# Patient Record
Sex: MALE | Race: WHITE | NOT HISPANIC OR LATINO | Employment: UNEMPLOYED | URBAN - METROPOLITAN AREA
[De-identification: names, ages, dates, MRNs, and addresses within clinical notes are randomized per-mention and may not be internally consistent; named-entity substitution may affect disease eponyms.]

---

## 2020-03-09 ENCOUNTER — TELEPHONE (OUTPATIENT)
Dept: CARDIOTHORACIC SURGERY | Facility: CLINIC | Age: 65
End: 2020-03-09

## 2020-03-09 NOTE — TELEPHONE ENCOUNTER
Called to schedule pt a consult appt.No answer , pt did not have a vm set up. Will try again at a later time.

## 2020-03-10 ENCOUNTER — TELEPHONE (OUTPATIENT)
Dept: CARDIOTHORACIC SURGERY | Facility: CLINIC | Age: 65
End: 2020-03-10

## 2020-03-10 NOTE — TELEPHONE ENCOUNTER
Scheduled pt for consult with Dr. Ceron. Pt was instructed to  disk of [facility]. Pt verbalized understanding appt letter mailed.

## 2020-03-18 ENCOUNTER — TELEPHONE (OUTPATIENT)
Dept: CARDIOTHORACIC SURGERY | Facility: CLINIC | Age: 65
End: 2020-03-18

## 2020-03-18 NOTE — TELEPHONE ENCOUNTER
Informed pt that rescheduling was his decision. Informed pt that if he was not feeling well he should keep his appt,but if he felt well and wanted to reschedule then he can do so. Pt opted to reschedule. Pt is now scheduled for  4/16 . Pt verbalized understanding.Appt letter mailed         ----- Message from Nola Solitario sent at 3/18/2020  3:23 PM CDT -----  Contact: pt called [phone]  Inquiring about appointment on 3/26/22.  Wanting to know if he should reshedule

## 2020-04-16 ENCOUNTER — OFFICE VISIT (OUTPATIENT)
Dept: CARDIOTHORACIC SURGERY | Facility: CLINIC | Age: 65
End: 2020-04-16
Payer: COMMERCIAL

## 2020-04-16 DIAGNOSIS — I25.110 CORONARY ARTERY DISEASE INVOLVING NATIVE CORONARY ARTERY OF NATIVE HEART WITH UNSTABLE ANGINA PECTORIS: Primary | ICD-10-CM

## 2020-04-16 PROBLEM — I25.119 ATHEROSCLEROSIS OF NATIVE CORONARY ARTERY WITH ANGINA PECTORIS: Status: ACTIVE | Noted: 2020-03-04

## 2020-04-16 PROCEDURE — 99205 PR OFFICE/OUTPT VISIT, NEW, LEVL V, 60-74 MIN: ICD-10-PCS | Mod: 95,,, | Performed by: THORACIC SURGERY (CARDIOTHORACIC VASCULAR SURGERY)

## 2020-04-16 PROCEDURE — 99205 OFFICE O/P NEW HI 60 MIN: CPT | Mod: 95,,, | Performed by: THORACIC SURGERY (CARDIOTHORACIC VASCULAR SURGERY)

## 2020-04-16 NOTE — PROGRESS NOTES
"The patient location is: at home in [place]   The chief complaint leading to consultation is: Consult for possible CABG  Visit type: audiovisual  Total time spent with patient: 35  Each patient to whom he or she provides medical services by telemedicine is:  (1) informed of the relationship between the physician and patient and the respective role of any other health care provider with respect to management of the patient; and (2) notified that he or she may decline to receive medical services by telemedicine and may withdraw from such care at any time.    Subjective:      Patient ID: Jeffery Melton is a 64 y.o. male.    Chief Complaint: No chief complaint on file.    HPI:  Jeffery Melton is a 64 y.o. male with a medical history of asthma, CAD, HLD, and HTN who presents to CTS via virtual visit for possible CABG. Patient had a [facility] cath perfomred in November of 2019 and was was told that he had lesion that were not stentable. Patient lives in [place] and was evaluated by CTS there however he would prefer to be seen in [place] since it is closer to his daughter.  He also states he would like a second option. Patient reports that he does have intermittent shortness and chest heaviness with physical activity.  He is able to ambulate without difficulty, appears to have good functional status.  He has a history of a "mild" stroke in 2003 but denies any deficits.  Patient does not drink ETOH or use tobacco.      Family and social history reviewed    Review of patient's allergies indicates:   Allergen Reactions    Penicillins      Past Medical History:   Diagnosis Date    Asthma     CAD (coronary artery disease)     Hyperlipidemia     Hypertension      No past surgical history on file.  Family History     Problem Relation (Age of Onset)    Hypertension Mother, Father        Social History     Socioeconomic History    Marital status:      Spouse name: Not on file    Number of children: Not on file    " Years of education: Not on file    Highest education level: Not on file   Occupational History    Not on file   Social Needs    Financial resource strain: Not on file    Food insecurity:     Worry: Not on file     Inability: Not on file    Transportation needs:     Medical: Not on file     Non-medical: Not on file   Tobacco Use    Smoking status: Never Smoker    Smokeless tobacco: Never Used   Substance and Sexual Activity    Alcohol use: Not Currently    Drug use: Never    Sexual activity: Not on file   Lifestyle    Physical activity:     Days per week: Not on file     Minutes per session: Not on file    Stress: Not on file   Relationships    Social connections:     Talks on phone: Not on file     Gets together: Not on file     Attends Hinduism service: Not on file     Active member of club or organization: Not on file     Attends meetings of clubs or organizations: Not on file     Relationship status: Not on file   Other Topics Concern    Not on file   Social History Narrative    Not on file       Current medications Reviewed    Review of Systems   Constitutional: Negative for activity change, appetite change, fatigue and fever.   HENT: Negative for nosebleeds.    Respiratory: Positive for shortness of breath. Negative for cough.    Cardiovascular: Positive for chest pain. Negative for palpitations and leg swelling.   Gastrointestinal: Negative for abdominal distention, abdominal pain and nausea.   Genitourinary: Negative for frequency.   Musculoskeletal: Negative for arthralgias and myalgias.   Skin: Negative for rash.   Neurological: Negative for dizziness and numbness.   Hematological: Does not bruise/bleed easily.     Objective:   Physical Exam: Unable to perform, patient was seen virtually    Diagnostic Results:   ECHO 3/5/2020  · Left Ventricle: Normal left ventricle cavity size. Normal wall motion.   Normal (55-65%) ejection fraction. Mild concentric hypertrophy observed.   Grade I (mild)  left ventricular diastolic dysfunction.  · Aortic Valve: The valve is tricuspid. The aortic valve is sclerotic   without reduced excursion. No aortic stenosis. No aortic regurgitation.  · Mitral Valve: Normal mitral valve structure. No mitral stenosis. No   mitral regurgitation.  · Right Ventricle: Normal right ventricle cavity size and ejection   fraction.  · Tricuspid Valve: Normal tricuspid valve structure. No tricuspid   regurgitation. No tricuspid stenosis.  · Pericardium: No pericardial effusion.    [facility] 11/11/2019    · 3 vessel coronary artery disease  · Normal LV function    Angiographic findings: There was heavy calcification of all coronary   arteries.    The left main coronary artery appears to have tubular narrowing of   approximately 40%.  No high-grade focal lesions were noted.    The left anterior descending artery had an ostial 40% stenosis.  There was   a proximal 80% stenosis before the takeoff of a very large diagonal   branch.  The LAD then became a very diminutive vessel or was even 100%   occluded after the diagonal branch.  The diagonal vessel had proximal 30%   disease present.    Left circumflex coronary artery had proximal 30% disease.  There were 2   marginal branches that were 100% occluded and filled via collaterals.    Based on collateral fill they appeared to be fairly small vessels but this   could be deceiving.    The right coronary artery was a large dominant vessel giving rise to a   large PDA and 1 posterior left ventricular branch.  There is proximal 70%   stenosis in the RCA.  The PDA and left ventricular branch had 60% lesions   present.    Hemodynamics LV pressure 110/13.  Aortic pressure 113/59.  There was no   gradient across the aortic valve on pullback.    LV gram: Overall LV function was normal with an estimated ejection   fraction of 60%.  There was a very small area of apical akinesis.    Comment: Based on this study consideration can be made for bypass surgery    although the marginal branches may be too small to bypass.  If this were   the case consideration could be made for stenting of the proximal LAD 80%   stenosis or simply trying more aggressive medical therapy and getting a   nuclear stress test.  Further consultation will be made.  All diagnotics and labs reviewed.    STS Score: 1.4%  Assessment:   1. CAD  Plan:   Plan for 2 vessel CABG    CTS Attending Note:    I agree with the MARINA's note as stated above.  I had a telemedicine visit with the patient.  I reviewed his angiogram.  I recommended 2 vessel coronary artery bypass grafting, with left internal mammary artery to either the large diagonal or the left anterior descending, depending on the findings at the time of operation.  I also recommended a saphenous vein to the posterior descending artery.  We will plan to get this done for him when we resume doing elective operations.

## 2020-04-16 NOTE — LETTER
[facility] - Cardiovascular Surg  [address]  [facility] [address]  Phone: [phone] April 20, 2020      Philip Toribio  [address] [facility] [address]  [facility] [address]    Patient: Jeffery Melton   MR Number: [id number]   YOB: 1955   Date of Visit: 4/16/2020     Dear Dr. Toribio,     I had a telemedicine visit with your patient Mr. Jeffery Melton today.  As you know, he is a very pleasant 64-year-old gentleman who had a coronary angiogram performed in November of last year.  The study demonstrated multivessel disease.  He sought an opinion here in [place] as this is closer to where his daughter lives.  I reviewed his angiogram, and spoke with him.  I recommended coronary artery bypass grafting, and he agreed with this.  We will plan to get this done once we resume non emergency procedures here at [facility].  My best guess is that we will get this done for him sometime in mid to late May.  When he does come to surgery, I will keep you apprised of his progress.  Thank you for sending him to me.    Sincerely,        Anuj Ceron MD  Chief, Division of Thoracic & Cardiovascular Surgery  [facility]    PEP/afw

## 2020-04-24 DIAGNOSIS — I25.110 CORONARY ARTERY DISEASE INVOLVING NATIVE CORONARY ARTERY OF NATIVE HEART WITH UNSTABLE ANGINA PECTORIS: Primary | ICD-10-CM

## 2020-05-07 DIAGNOSIS — I25.110 CORONARY ARTERY DISEASE INVOLVING NATIVE CORONARY ARTERY OF NATIVE HEART WITH UNSTABLE ANGINA PECTORIS: Primary | ICD-10-CM

## 2020-05-22 ENCOUNTER — ANESTHESIA EVENT (OUTPATIENT)
Dept: SURGERY | Facility: HOSPITAL | Age: 65
DRG: 236 | End: 2020-05-22
Payer: COMMERCIAL

## 2020-06-03 ENCOUNTER — LAB VISIT (OUTPATIENT)
Dept: INTERNAL MEDICINE | Facility: CLINIC | Age: 65
DRG: 236 | End: 2020-06-03
Payer: COMMERCIAL

## 2020-06-03 DIAGNOSIS — I25.110 CORONARY ARTERY DISEASE INVOLVING NATIVE CORONARY ARTERY OF NATIVE HEART WITH UNSTABLE ANGINA PECTORIS: ICD-10-CM

## 2020-06-03 PROCEDURE — U0003 INFECTIOUS AGENT DETECTION BY NUCLEIC ACID (DNA OR RNA); SEVERE ACUTE RESPIRATORY SYNDROME CORONAVIRUS 2 (SARS-COV-2) (CORONAVIRUS DISEASE [COVID-19]), AMPLIFIED PROBE TECHNIQUE, MAKING USE OF HIGH THROUGHPUT TECHNOLOGIES AS DESCRIBED BY CMS-2020-01-R: HCPCS

## 2020-06-04 ENCOUNTER — HOSPITAL ENCOUNTER (OUTPATIENT)
Dept: VASCULAR SURGERY | Facility: CLINIC | Age: 65
Discharge: HOME | End: 2020-06-04
Attending: THORACIC SURGERY (CARDIOTHORACIC VASCULAR SURGERY)
Payer: COMMERCIAL

## 2020-06-04 ENCOUNTER — HOSPITAL ENCOUNTER (OUTPATIENT)
Dept: CARDIOLOGY | Facility: CLINIC | Age: 65
Discharge: HOME | End: 2020-06-04
Payer: COMMERCIAL

## 2020-06-04 ENCOUNTER — HOSPITAL ENCOUNTER (OUTPATIENT)
Dept: CARDIOLOGY | Facility: CLINIC | Age: 65
Discharge: HOME | DRG: 236 | End: 2020-06-04
Attending: THORACIC SURGERY (CARDIOTHORACIC VASCULAR SURGERY)
Payer: COMMERCIAL

## 2020-06-04 ENCOUNTER — HOSPITAL ENCOUNTER (OUTPATIENT)
Dept: PULMONOLOGY | Facility: CLINIC | Age: 65
Discharge: HOME | End: 2020-06-04
Payer: COMMERCIAL

## 2020-06-04 ENCOUNTER — HOSPITAL ENCOUNTER (OUTPATIENT)
Dept: RADIOLOGY | Facility: HOSPITAL | Age: 65
Discharge: HOME | DRG: 236 | End: 2020-06-04
Attending: THORACIC SURGERY (CARDIOTHORACIC VASCULAR SURGERY)
Payer: COMMERCIAL

## 2020-06-04 ENCOUNTER — OFFICE VISIT (OUTPATIENT)
Dept: CARDIOTHORACIC SURGERY | Facility: CLINIC | Age: 65
End: 2020-06-04
Payer: COMMERCIAL

## 2020-06-04 ENCOUNTER — HOSPITAL ENCOUNTER (OUTPATIENT)
Dept: PREADMISSION TESTING | Facility: HOSPITAL | Age: 65
Discharge: HOME | End: 2020-06-04
Attending: THORACIC SURGERY (CARDIOTHORACIC VASCULAR SURGERY)
Payer: COMMERCIAL

## 2020-06-04 VITALS
RESPIRATION RATE: 16 BRPM | HEART RATE: 89 BPM | BODY MASS INDEX: 31.64 KG/M2 | SYSTOLIC BLOOD PRESSURE: 142 MMHG | WEIGHT: 226 LBS | OXYGEN SATURATION: 98 % | HEIGHT: 71 IN | DIASTOLIC BLOOD PRESSURE: 90 MMHG | TEMPERATURE: 98 F

## 2020-06-04 VITALS
HEIGHT: 71 IN | BODY MASS INDEX: 31.81 KG/M2 | DIASTOLIC BLOOD PRESSURE: 88 MMHG | SYSTOLIC BLOOD PRESSURE: 156 MMHG | HEART RATE: 65 BPM | WEIGHT: 227.19 LBS | TEMPERATURE: 98 F | OXYGEN SATURATION: 97 %

## 2020-06-04 VITALS
DIASTOLIC BLOOD PRESSURE: 90 MMHG | HEIGHT: 71 IN | HEART RATE: 80 BPM | SYSTOLIC BLOOD PRESSURE: 142 MMHG | WEIGHT: 226 LBS | BODY MASS INDEX: 31.64 KG/M2

## 2020-06-04 DIAGNOSIS — I25.110 CORONARY ARTERY DISEASE INVOLVING NATIVE CORONARY ARTERY OF NATIVE HEART WITH UNSTABLE ANGINA PECTORIS: ICD-10-CM

## 2020-06-04 DIAGNOSIS — I25.119 ATHEROSCLEROSIS OF NATIVE CORONARY ARTERY OF NATIVE HEART WITH ANGINA PECTORIS: Primary | ICD-10-CM

## 2020-06-04 DIAGNOSIS — Z01.818 PRE-OP EXAM: ICD-10-CM

## 2020-06-04 LAB
ASCENDING AORTA: 3.36 CM
AV INDEX (PROSTH): 1.06
AV MEAN GRADIENT: 3 MMHG
AV PEAK GRADIENT: 6 MMHG
AV VALVE AREA: 4.51 CM2
AV VELOCITY RATIO: 0.92
BSA FOR ECHO PROCEDURE: 2.27 M2
CV ECHO LV RWT: 0.5 CM
DLCO ADJ PRE: 27.35 ML/(MIN*MMHG) (ref 21.7–35.56)
DLCO SINGLE BREATH LLN: 21.7
DLCO SINGLE BREATH PRE REF: 94.7 %
DLCO SINGLE BREATH REF: 28.63
DLCOC SBVA LLN: 2.84
DLCOC SBVA PRE REF: 124.8 %
DLCOC SBVA REF: 4.01
DLCOC SINGLE BREATH LLN: 21.7
DLCOC SINGLE BREATH PRE REF: 95.5 %
DLCOC SINGLE BREATH REF: 28.63
DLCOCSBVAULN: 5.18
DLCOCSINGLEBREATHULN: 35.56
DLCOSINGLEBREATHULN: 35.56
DLCOVA LLN: 2.84
DLCOVA PRE REF: 123.7 %
DLCOVA PRE: 4.96 ML/(MIN*MMHG*L) (ref 2.84–5.18)
DLCOVA REF: 4.01
DLCOVAULN: 5.18
DLVAADJ PRE: 5 ML/(MIN*MMHG*L) (ref 2.84–5.18)
DOP CALC AO PEAK VEL: 1.19 M/S
DOP CALC AO VTI: 18.98 CM
DOP CALC LVOT AREA: 4.3 CM2
DOP CALC LVOT DIAMETER: 2.33 CM
DOP CALC LVOT PEAK VEL: 1.1 M/S
DOP CALC LVOT STROKE VOLUME: 85.57 CM3
DOP CALCLVOT PEAK VEL VTI: 20.08 CM
E WAVE DECELERATION TIME: 152.92 MSEC
E/A RATIO: 0.71
E/E' RATIO: 6.11 M/S
ECHO LV POSTERIOR WALL: 1.18 CM (ref 0.6–1.1)
FEF 25 75 LLN: 1.32
FEF 25 75 PRE REF: 72.2 %
FEF 25 75 REF: 2.8
FEV05 LLN: 1.63
FEV05 REF: 2.77
FEV1 FVC LLN: 64
FEV1 FVC PRE REF: 98.3 %
FEV1 FVC REF: 77
FEV1 LLN: 2.58
FEV1 PRE REF: 73.5 %
FEV1 REF: 3.47
FRACTIONAL SHORTENING: 29 % (ref 28–44)
FVC LLN: 3.42
FVC PRE REF: 74.5 %
FVC REF: 4.52
INTERVENTRICULAR SEPTUM: 0.82 CM (ref 0.6–1.1)
IVC PRE: 3.42 L (ref 3.42–5.62)
IVC SINGLE BREATH LLN: 3.42
IVC SINGLE BREATH PRE REF: 75.7 %
IVC SINGLE BREATH REF: 4.52
IVCSINGLEBREATHULN: 5.62
IVRT: 99.9 MSEC
LA MAJOR: 5.12 CM
LA MINOR: 5.04 CM
LA WIDTH: 4.59 CM
LEFT ATRIUM SIZE: 2.96 CM
LEFT ATRIUM VOLUME INDEX: 26.4 ML/M2
LEFT ATRIUM VOLUME: 58.66 CM3
LEFT INTERNAL DIMENSION IN SYSTOLE: 3.35 CM (ref 2.1–4)
LEFT VENTRICLE DIASTOLIC VOLUME INDEX: 47.27 ML/M2
LEFT VENTRICLE DIASTOLIC VOLUME: 105.01 ML
LEFT VENTRICLE MASS INDEX: 75 G/M2
LEFT VENTRICLE SYSTOLIC VOLUME INDEX: 20.6 ML/M2
LEFT VENTRICLE SYSTOLIC VOLUME: 45.67 ML
LEFT VENTRICULAR INTERNAL DIMENSION IN DIASTOLE: 4.75 CM (ref 3.5–6)
LEFT VENTRICULAR MASS: 167.31 G
LV LATERAL E/E' RATIO: 5.27 M/S
LV SEPTAL E/E' RATIO: 7.25 M/S
MV PEAK A VEL: 0.82 M/S
MV PEAK E VEL: 0.58 M/S
MVV LLN: 113
MVV PRE REF: 66.7 %
MVV REF: 133
PEF LLN: 6.69
PEF PRE REF: 81 %
PEF REF: 9.01
PHYSICIAN COMMENT: ABNORMAL
PISA TR MAX VEL: 2.13 M/S
PRE DLCO: 27.12 ML/(MIN*MMHG) (ref 21.7–35.56)
PRE FEF 25 75: 2.02 L/S (ref 1.32–4.27)
PRE FET 100: 6.77 SEC
PRE FEV05 REF: 70.3 %
PRE FEV1 FVC: 75.63 % (ref 64.48–89.4)
PRE FEV1: 2.55 L (ref 2.58–4.36)
PRE FEV5: 1.95 L (ref 1.63–3.9)
PRE FVC: 3.37 L (ref 3.42–5.62)
PRE MVV: 89 L/MIN (ref 113.46–153.5)
PRE PEF: 7.29 L/S (ref 6.69–11.33)
PULM VEIN S/D RATIO: 0.76
PV PEAK D VEL: 0.33 M/S
PV PEAK S VEL: 0.25 M/S
RA MAJOR: 4.53 CM
RA PRESSURE: 3 MMHG
RA WIDTH: 3.47 CM
RIGHT VENTRICULAR END-DIASTOLIC DIMENSION: 3.75 CM
RV TISSUE DOPPLER FREE WALL SYSTOLIC VELOCITY 1 (APICAL 4 CHAMBER VIEW): 14.07 CM/S
SARS-COV-2 RNA RESP QL NAA+PROBE: NOT DETECTED
SINUS: 3.82 CM
STJ: 3.05 CM
TDI LATERAL: 0.11 M/S
TDI SEPTAL: 0.08 M/S
TDI: 0.1 M/S
TR MAX PG: 18 MMHG
TRICUSPID ANNULAR PLANE SYSTOLIC EXCURSION: 2.63 CM
TV REST PULMONARY ARTERY PRESSURE: 21 MMHG
VA PRE: 5.48 L (ref 6.99–6.99)
VA SINGLE BREATH LLN: 6.99
VA SINGLE BREATH PRE REF: 78.4 %
VA SINGLE BREATH REF: 6.99
VASINGLEBREATHULN: 6.99

## 2020-06-04 PROCEDURE — 99499 UNLISTED E&M SERVICE: CPT | Mod: S$GLB,,, | Performed by: THORACIC SURGERY (CARDIOTHORACIC VASCULAR SURGERY)

## 2020-06-04 PROCEDURE — 71046 XR CHEST PA AND LATERAL: ICD-10-PCS | Mod: 26,,, | Performed by: RADIOLOGY

## 2020-06-04 PROCEDURE — 99499 NO LOS: ICD-10-PCS | Mod: S$GLB,,, | Performed by: THORACIC SURGERY (CARDIOTHORACIC VASCULAR SURGERY)

## 2020-06-04 PROCEDURE — 93880 EXTRACRANIAL BILAT STUDY: CPT | Mod: S$GLB,,, | Performed by: SURGERY

## 2020-06-04 PROCEDURE — 94010 BREATHING CAPACITY TEST: ICD-10-PCS | Mod: S$GLB,,, | Performed by: INTERNAL MEDICINE

## 2020-06-04 PROCEDURE — 93005 EKG 12-LEAD: ICD-10-PCS | Mod: S$GLB,,, | Performed by: THORACIC SURGERY (CARDIOTHORACIC VASCULAR SURGERY)

## 2020-06-04 PROCEDURE — 94010 BREATHING CAPACITY TEST: CPT | Mod: S$GLB,,, | Performed by: INTERNAL MEDICINE

## 2020-06-04 PROCEDURE — 93005 ELECTROCARDIOGRAM TRACING: CPT | Mod: S$GLB,,, | Performed by: THORACIC SURGERY (CARDIOTHORACIC VASCULAR SURGERY)

## 2020-06-04 PROCEDURE — 94729 PR C02/MEMBANE DIFFUSE CAPACITY: ICD-10-PCS | Mod: S$GLB,,, | Performed by: INTERNAL MEDICINE

## 2020-06-04 PROCEDURE — 93010 ELECTROCARDIOGRAM REPORT: CPT | Mod: S$GLB,,, | Performed by: INTERNAL MEDICINE

## 2020-06-04 PROCEDURE — 99999 PR PBB SHADOW E&M-EST. PATIENT-LVL III: CPT | Mod: PBBFAC,,, | Performed by: THORACIC SURGERY (CARDIOTHORACIC VASCULAR SURGERY)

## 2020-06-04 PROCEDURE — 99999 PR PBB SHADOW E&M-EST. PATIENT-LVL III: ICD-10-PCS | Mod: PBBFAC,,, | Performed by: THORACIC SURGERY (CARDIOTHORACIC VASCULAR SURGERY)

## 2020-06-04 PROCEDURE — 93010 EKG 12-LEAD: ICD-10-PCS | Mod: S$GLB,,, | Performed by: INTERNAL MEDICINE

## 2020-06-04 PROCEDURE — 71046 X-RAY EXAM CHEST 2 VIEWS: CPT | Mod: 26,,, | Performed by: RADIOLOGY

## 2020-06-04 PROCEDURE — 94729 DIFFUSING CAPACITY: CPT | Mod: S$GLB,,, | Performed by: INTERNAL MEDICINE

## 2020-06-04 PROCEDURE — 71046 X-RAY EXAM CHEST 2 VIEWS: CPT | Mod: TC,FY

## 2020-06-04 PROCEDURE — 93306 ECHO (CUPID ONLY): ICD-10-PCS | Mod: 26,,, | Performed by: INTERNAL MEDICINE

## 2020-06-04 PROCEDURE — 93306 TTE W/DOPPLER COMPLETE: CPT

## 2020-06-04 PROCEDURE — 93306 TTE W/DOPPLER COMPLETE: CPT | Mod: 26,,, | Performed by: INTERNAL MEDICINE

## 2020-06-04 PROCEDURE — 93880 PR DUPLEX SCAN EXTRACRANIAL,BILAT: ICD-10-PCS | Mod: S$GLB,,, | Performed by: SURGERY

## 2020-06-04 RX ORDER — PROPOFOL 10 MG/ML
5 INJECTION, EMULSION INTRAVENOUS CONTINUOUS
Status: CANCELLED | OUTPATIENT
Start: 2020-06-04

## 2020-06-04 RX ORDER — DEXTROSE MONOHYDRATE, SODIUM CHLORIDE, AND POTASSIUM CHLORIDE 50; 1.49; 4.5 G/1000ML; G/1000ML; G/1000ML
INJECTION, SOLUTION INTRAVENOUS CONTINUOUS
Status: CANCELLED | OUTPATIENT
Start: 2020-06-04

## 2020-06-04 RX ORDER — MUPIROCIN 20 MG/G
1 OINTMENT TOPICAL 2 TIMES DAILY
Status: CANCELLED | OUTPATIENT
Start: 2020-06-04 | End: 2020-06-09

## 2020-06-04 RX ORDER — ACETAMINOPHEN 325 MG/1
650 TABLET ORAL EVERY 4 HOURS PRN
Status: CANCELLED | OUTPATIENT
Start: 2020-06-04

## 2020-06-04 RX ORDER — OXYCODONE HYDROCHLORIDE 5 MG/1
10 TABLET ORAL EVERY 4 HOURS PRN
Status: CANCELLED | OUTPATIENT
Start: 2020-06-04

## 2020-06-04 RX ORDER — BISACODYL 10 MG
10 SUPPOSITORY, RECTAL RECTAL EVERY 12 HOURS PRN
Status: CANCELLED | OUTPATIENT
Start: 2020-06-04

## 2020-06-04 RX ORDER — FENTANYL CITRATE 50 UG/ML
25 INJECTION, SOLUTION INTRAMUSCULAR; INTRAVENOUS
Status: CANCELLED | OUTPATIENT
Start: 2020-06-04

## 2020-06-04 RX ORDER — POTASSIUM CHLORIDE 750 MG/1
20 TABLET, EXTENDED RELEASE ORAL EVERY 12 HOURS
Status: CANCELLED | OUTPATIENT
Start: 2020-06-04

## 2020-06-04 RX ORDER — OXYCODONE HYDROCHLORIDE 5 MG/1
5 TABLET ORAL EVERY 4 HOURS PRN
Status: CANCELLED | OUTPATIENT
Start: 2020-06-04

## 2020-06-04 RX ORDER — METOPROLOL TARTRATE 25 MG/1
25 TABLET ORAL
Status: CANCELLED | OUTPATIENT
Start: 2020-06-04

## 2020-06-04 RX ORDER — FENTANYL CITRATE 50 UG/ML
25 INJECTION, SOLUTION INTRAMUSCULAR; INTRAVENOUS
Status: CANCELLED | OUTPATIENT
Start: 2020-06-04 | End: 2020-06-09

## 2020-06-04 RX ORDER — POTASSIUM CHLORIDE 14.9 MG/ML
20 INJECTION INTRAVENOUS
Status: CANCELLED | OUTPATIENT
Start: 2020-06-04

## 2020-06-04 RX ORDER — POLYETHYLENE GLYCOL 3350 17 G/17G
17 POWDER, FOR SOLUTION ORAL DAILY
Status: CANCELLED | OUTPATIENT
Start: 2020-06-05

## 2020-06-04 RX ORDER — LIDOCAINE HYDROCHLORIDE 10 MG/ML
1 INJECTION, SOLUTION EPIDURAL; INFILTRATION; INTRACAUDAL; PERINEURAL
Status: CANCELLED | OUTPATIENT
Start: 2020-06-04

## 2020-06-04 RX ORDER — FENTANYL CITRATE 50 UG/ML
50 INJECTION, SOLUTION INTRAMUSCULAR; INTRAVENOUS
Status: CANCELLED | OUTPATIENT
Start: 2020-06-10

## 2020-06-04 RX ORDER — PANTOPRAZOLE SODIUM 40 MG/10ML
40 INJECTION, POWDER, LYOPHILIZED, FOR SOLUTION INTRAVENOUS DAILY
Status: CANCELLED | OUTPATIENT
Start: 2020-06-05

## 2020-06-04 RX ORDER — ASPIRIN 325 MG
325 TABLET ORAL DAILY
Status: CANCELLED | OUTPATIENT
Start: 2020-06-05

## 2020-06-04 RX ORDER — MUPIROCIN 20 MG/G
1 OINTMENT TOPICAL
Status: CANCELLED | OUTPATIENT
Start: 2020-06-04

## 2020-06-04 RX ORDER — ASPIRIN 325 MG
325 TABLET, DELAYED RELEASE (ENTERIC COATED) ORAL DAILY
Status: CANCELLED | OUTPATIENT
Start: 2020-06-05

## 2020-06-04 RX ORDER — SODIUM CHLORIDE 0.9 % (FLUSH) 0.9 %
10 SYRINGE (ML) INJECTION
Status: CANCELLED | OUTPATIENT
Start: 2020-06-04

## 2020-06-04 RX ORDER — ASPIRIN 300 MG/1
300 SUPPOSITORY RECTAL ONCE AS NEEDED
Status: CANCELLED | OUTPATIENT
Start: 2020-06-04 | End: 2031-11-01

## 2020-06-04 RX ORDER — ALBUMIN HUMAN 50 G/1000ML
500 SOLUTION INTRAVENOUS ONCE AS NEEDED
Status: CANCELLED | OUTPATIENT
Start: 2020-06-04 | End: 2031-11-01

## 2020-06-04 RX ORDER — ONDANSETRON 2 MG/ML
4 INJECTION INTRAMUSCULAR; INTRAVENOUS EVERY 12 HOURS PRN
Status: CANCELLED | OUTPATIENT
Start: 2020-06-04

## 2020-06-04 RX ORDER — IPRATROPIUM BROMIDE AND ALBUTEROL SULFATE 2.5; .5 MG/3ML; MG/3ML
3 SOLUTION RESPIRATORY (INHALATION) EVERY 4 HOURS
Status: CANCELLED | OUTPATIENT
Start: 2020-06-04 | End: 2020-06-05

## 2020-06-04 RX ORDER — IPRATROPIUM BROMIDE AND ALBUTEROL SULFATE 2.5; .5 MG/3ML; MG/3ML
3 SOLUTION RESPIRATORY (INHALATION) EVERY 4 HOURS PRN
Status: CANCELLED | OUTPATIENT
Start: 2020-06-04 | End: 2020-06-05

## 2020-06-04 RX ORDER — POTASSIUM CHLORIDE 29.8 MG/ML
40 INJECTION INTRAVENOUS
Status: CANCELLED | OUTPATIENT
Start: 2020-06-04

## 2020-06-04 RX ORDER — POTASSIUM CHLORIDE 14.9 MG/ML
60 INJECTION INTRAVENOUS
Status: CANCELLED | OUTPATIENT
Start: 2020-06-04

## 2020-06-04 RX ORDER — ATORVASTATIN CALCIUM 10 MG/1
40 TABLET, FILM COATED ORAL NIGHTLY
Status: CANCELLED | OUTPATIENT
Start: 2020-06-04

## 2020-06-04 RX ORDER — METOCLOPRAMIDE HYDROCHLORIDE 5 MG/ML
5 INJECTION INTRAMUSCULAR; INTRAVENOUS EVERY 6 HOURS PRN
Status: CANCELLED | OUTPATIENT
Start: 2020-06-04

## 2020-06-04 RX ORDER — SODIUM CHLORIDE 9 MG/ML
INJECTION, SOLUTION INTRAVENOUS CONTINUOUS
Status: CANCELLED | OUTPATIENT
Start: 2020-06-04

## 2020-06-04 RX ORDER — PANTOPRAZOLE SODIUM 40 MG/1
40 TABLET, DELAYED RELEASE ORAL
Status: CANCELLED | OUTPATIENT
Start: 2020-06-05

## 2020-06-04 RX ORDER — ASPIRIN 325 MG
325 TABLET ORAL ONCE
Status: CANCELLED | OUTPATIENT
Start: 2020-06-04 | End: 2020-06-04

## 2020-06-04 RX ORDER — DOCUSATE SODIUM 100 MG/1
100 CAPSULE, LIQUID FILLED ORAL 2 TIMES DAILY
Status: CANCELLED | OUTPATIENT
Start: 2020-06-04

## 2020-06-04 NOTE — ANESTHESIA PAT ROS NOTE
06/04/2020  Jeffery Melton is a 64 y.o., male.      Pre-op Assessment          Review of Systems  Anesthesia Hx:  No problems with previous Anesthesia  History of prior surgery of interest to airway management or planning: Previous anesthesia: General 2012-HERNIA REPAIR with general anesthesia.  Denies Family Hx of Anesthesia complications.   Denies Personal Hx of Anesthesia complications.   Social:  Non-Smoker, Social Alcohol Use    Hematology/Oncology:  Hematology Normal   Oncology Normal     EENT/Dental:EENT/Dental Normal   Cardiovascular:   Hypertension CAD   Angina, with exertion hyperlipidemia KRUEGER 3/5/20-ECHO EF 55-65%. GRADE 1 DIASTOLIC DYSFUNCTION. Functional Capacity good / => 4 METS  Coronary Artery Disease:  hx of Percutaneous Coronary Intervention (PCI).    Pulmonary:   Asthma moderate Denies Shortness of breath.  Denies Recent URI.  Asthma:  last episode was 1 - 12 months ago. Emergency visits this year is none.   Renal/:   renal calculi    Hepatic/GI:   GERD, well controlled PATIENT HAS OSTOMY Denies Esophageal / Stomach Disorders  Bowel Conditions:  Inflammatory Bowel Disease, Crohns  Hernia, Denies Abdominal Hernia   Musculoskeletal:  Musculoskeletal Normal    Neurological:  TIA - Transient Ischemic Attack , Most recent TIA was on 2003 , has had 1 TIA , transient deficits are no residual deficit.   Endocrine:  Metabolic Disorders, Morbid Obesity / BMI > 40  Psych:  Psychiatric Normal           Physical Exam  General:  Well nourished, Obesity    Airway/Jaw/Neck:  Airway Findings: Mouth Opening: Normal Tongue: Normal  Jaw/Neck Findings:  Neck ROM: Normal ROM  Neck Findings:  Girth Increased      Dental:  Dental Findings: In tact   Chest/Lungs:  Chest/Lungs Findings: Clear to auscultation     Heart/Vascular:  Heart Findings: Rate: Normal        Mental Status:  Mental Status Findings:   Cooperative, Alert and Oriented

## 2020-06-04 NOTE — H&P (VIEW-ONLY)
"  Subjective:      Patient ID: Jeffery Melton is a 64 y.o. male.     Chief Complaint: No chief complaint on file.     HPI:  Jeffery Melton is a 64 y.o. male with a medical history of asthma, CAD, HLD, and HTN who presents to CTS via virtual visit for possible CABG. Patient had a [facility] cath perfomred in November of 2019 and was was told that he had lesion that were not stentable. Patient lives in [place] and was evaluated by CTS there however he would prefer to be seen in [place] since it is closer to his daughter.  He also states he would like a second option. Patient reports that he does have intermittent shortness and chest heaviness with physical activity.  He is able to ambulate without difficulty, appears to have good functional status.  He has a history of a "mild" stroke in 2003 but denies any deficits.  Patient does not drink ETOH or use tobacco.        Family and social history reviewed          Review of patient's allergies indicates:   Allergen Reactions    Penicillins             Past Medical History:   Diagnosis Date    Asthma      CAD (coronary artery disease)      Hyperlipidemia      Hypertension        No past surgical history on file.       Family History      Problem Relation (Age of Onset)     Hypertension Mother, Father          Social History               Socioeconomic History    Marital status:        Spouse name: Not on file    Number of children: Not on file    Years of education: Not on file    Highest education level: Not on file   Occupational History    Not on file   Social Needs    Financial resource strain: Not on file    Food insecurity:       Worry: Not on file       Inability: Not on file    Transportation needs:       Medical: Not on file       Non-medical: Not on file   Tobacco Use    Smoking status: Never Smoker    Smokeless tobacco: Never Used   Substance and Sexual Activity    Alcohol use: Not Currently    Drug use: Never    Sexual activity: Not on " file   Lifestyle    Physical activity:       Days per week: Not on file       Minutes per session: Not on file    Stress: Not on file   Relationships    Social connections:       Talks on phone: Not on file       Gets together: Not on file       Attends Latter-day service: Not on file       Active member of club or organization: Not on file       Attends meetings of clubs or organizations: Not on file       Relationship status: Not on file   Other Topics Concern    Not on file   Social History Narrative    Not on file            Current Outpatient Medications on File Prior to Visit   Medication Sig Dispense Refill Last Dose    aspirin 81 MG Chew Take 81 mg by mouth.       atorvastatin (LIPITOR) 80 MG tablet        enalapril (VASOTEC) 20 MG tablet Take 20 mg by mouth.       hydroCHLOROthiazide (HYDRODIURIL) 25 MG tablet Take 25 mg by mouth.       loratadine (CLARITIN) 10 mg tablet Take 10 mg by mouth.       omega-3 fatty acids/fish oil (FISH OIL-OMEGA-3 FATTY ACIDS) 300-1,000 mg capsule Take 2 g by mouth.       pantoprazole (PROTONIX) 40 MG tablet Take 40 mg by mouth.          Current medications Reviewed     Review of Systems   Constitutional: Negative for activity change, appetite change, fatigue and fever.   HENT: Negative for nosebleeds.    Respiratory: Positive for shortness of breath. Negative for cough.    Cardiovascular: Positive for chest pain. Negative for palpitations and leg swelling.   Gastrointestinal: Negative for abdominal distention, abdominal pain and nausea.   Genitourinary: Negative for frequency.   Musculoskeletal: Negative for arthralgias and myalgias.   Skin: Negative for rash.   Neurological: Negative for dizziness and numbness.   Hematological: Does not bruise/bleed easily.      Objective:   Physical Exam: Unable to perform, patient was seen virtually     Diagnostic Results:   ECHO 3/5/2020  · Left Ventricle: Normal left ventricle cavity size. Normal wall motion.   Normal (55-65%)  ejection fraction. Mild concentric hypertrophy observed.   Grade I (mild) left ventricular diastolic dysfunction.  · Aortic Valve: The valve is tricuspid. The aortic valve is sclerotic   without reduced excursion. No aortic stenosis. No aortic regurgitation.  · Mitral Valve: Normal mitral valve structure. No mitral stenosis. No   mitral regurgitation.  · Right Ventricle: Normal right ventricle cavity size and ejection   fraction.  · Tricuspid Valve: Normal tricuspid valve structure. No tricuspid   regurgitation. No tricuspid stenosis.  · Pericardium: No pericardial effusion.     [facility] 11/11/2019    · 3 vessel coronary artery disease  · Normal LV function    Angiographic findings: There was heavy calcification of all coronary   arteries.    The left main coronary artery appears to have tubular narrowing of   approximately 40%.  No high-grade focal lesions were noted.    The left anterior descending artery had an ostial 40% stenosis.  There was   a proximal 80% stenosis before the takeoff of a very large diagonal   branch.  The LAD then became a very diminutive vessel or was even 100%   occluded after the diagonal branch.  The diagonal vessel had proximal 30%   disease present.    Left circumflex coronary artery had proximal 30% disease.  There were 2   marginal branches that were 100% occluded and filled via collaterals.    Based on collateral fill they appeared to be fairly small vessels but this   could be deceiving.    The right coronary artery was a large dominant vessel giving rise to a   large PDA and 1 posterior left ventricular branch.  There is proximal 70%   stenosis in the RCA.  The PDA and left ventricular branch had 60% lesions   present.    Hemodynamics LV pressure 110/13.  Aortic pressure 113/59.  There was no   gradient across the aortic valve on pullback.    LV gram: Overall LV function was normal with an estimated ejection   fraction of 60%.  There was a very small area of apical akinesis.    Comment:  Based on this study consideration can be made for bypass surgery   although the marginal branches may be too small to bypass.  If this were   the case consideration could be made for stenting of the proximal LAD 80%   stenosis or simply trying more aggressive medical therapy and getting a   nuclear stress test.  Further consultation will be made.  All diagnotics and labs reviewed.     STS Score: 1.4%  Assessment:   1. CAD  Plan:   CABG x2

## 2020-06-04 NOTE — DISCHARGE INSTRUCTIONS
Your surgery has been scheduled for:__________________________________________    You should report to:  ____[facility], located on the [place] side of the first floor of the           [facility] ([phone])  ____[facility], located on the [facility] side of the            Second floor of the [facility] ([phone])  ____3rd Floor [facility] located on the [facility] side of the [facility] [phone]  Please Note   - Tell your doctor if you take Aspirin, products containing Aspirin, herbal medications  or blood thinners, such as Coumadin, Ticlid, or Plavix.  (Consult your provider regarding holding or stopping before surgery).  - Arrange for someone to drive you home following surgery.  You will not be allowed to leave the surgical facility alone or drive yourself home following sedation and anesthesia.  Before Surgery  - Stop taking all vitamins/ herbal medications 14days prior to surgery  - No Motrin/Advil (Ibuprofen) 1 day before surgery  - No Aleve (Naproxen) 7 days before surgery  - Stop Taking Asprin, products containing Asprin _____days before surgery  - Stop taking blood thinners_______days before surgery  - No Goody's/BC  Powder 7 days before surgery  - Refrain from drinking alcoholic beverages for 24hours before and after surgery  - Stop or limit smoking _________days before surgery  - You may take Tylenol for pain  Night before Surgery   Stop ALL solid food, gum, candy (including vitamins) 8 hours before arrival time.  (Please note: If your surgeon gives you different eating and drinking instructions, please follow surgeon's directions.)   Stop all CLOUDY liquids: coffee with creamer, formula, tube feeds, cloudy juices, non-human milk and breast milk with additives, 6 hours prior to arrival time.   Stop plain breast milk 4 hours prior to arrival time.   The patient should be ENCOURAGED to drink  carbohydrate-rich clear liquids (sports drinks, clear juices) until 2 hours prior to arrival time.   CLEAR liquids include only water, black coffee NO creamer, clear oral rehydration drinks, clear sports drinks or clear fruit juices (no orange juice, no pulpy juices, no apple cider). Advise patients if they can read newsprint through the liquid, it qualifies as clear liquid.    IF IN DOUBT, drink water instead.   - Take a shower or bath (shower is recommended).  Bathe with Hibiclens soap or an antibacterial soap from the neck down.  If not supplied by your surgeon, hibiclens soap will need to be purchased over the counter in pharmacy.  Rinse soap off thoroughly.  - Shampoo your hair with your regular shampoo  The Day of Surgery  · NOTHING TO  DRINK 2 hours before arrival time. If you are told to take medication on the morning of surgery, it may be taken with a sip of water.   - Take another bath or shower with hibiclens or any antibacterial soap, to reduce the chance of infection.  - Take heart and blood pressure medications with a small sip of water, as advised by the perioperative team.  - Do not take fluid pills  - You may brush your teeth and rinse your mouth, but do not swall any additional water.   - Do not apply perfumes, powder, body lotions or deodorant on the day of surgery.  - Nail polish should be removed.  - Do not wear makeup or moisturizer  - Wear comfortable clothes, such as a button front shirt and loose fitting pants.  - Leave all jewelry, including body piercings, and valuables at home.    - Bring any devices you will neeed after surgery such as crutches or canes.  - If you have sleep apnea, please bring your CPAP machine  In the event that your physical condition changes including the onset of a cold or respiratory illness, or if you have to delay or cancel your surgery, please notify your surgeon.    Anesthesia: General Anesthesia     You are watched continuously during your procedure by your  anesthesia provider.     Youre due to have surgery. During surgery, youll be given medicine called anesthesia or anesthetic. This will keep you comfortable and pain-free. Your anesthesia provider will use general anesthesia.  What is general anesthesia?  General anesthesia puts you into a state like deep sleep. It goes into the bloodstream (IV anesthetics), into the lungs (gas anesthetics), or both. You feel nothing during the procedure. You will not remember it. During the procedure, the anesthesia provider monitors you continuously. He or she checks your heart rate and rhythm, blood pressure, breathing, and blood oxygen.  · IV anesthetics. IV anesthetics are given through an IV line in your arm. Theyre often given first. This is so you are asleep before a gas anesthetic is started. Some kinds of IV anesthetics relieve pain. Others relax you. Your doctor will decide which kind is best in your case.  · Gas anesthetics. Gas anesthetics are breathed into the lungs. They are often used to keep you asleep. They can be given through a facemask or a tube placed in your larynx or trachea (breathing tube).  ? If you have a facemask, your anesthesia provider will most likely place it over your nose and mouth while youre still awake. Youll breathe oxygen through the mask as your IV anesthetic is started. Gas anesthetic may be added through the mask.  ? If you have a tube in the larynx or trachea, it will be inserted into your throat after youre asleep.  Anesthesia tools and medicines  You will likely have:  · IV anesthetics. These are put into an IV line into your bloodstream.  · Gas anesthetics. You breathe these anesthetics into your lungs, where they pass into your bloodstream.  · Pulse oximeter. This is a small clip that is attached to the end of your finger. This measures your blood oxygen level.  · Electrocardiography leads (electrodes). These are small sticky pads that are placed on your chest. They record your  heart rate and rhythm.  · Blood pressure cuff. This reads your blood pressure.  Risks and possible complications  General anesthesia has some risks. These include:  · Breathing problems  · Nausea and vomiting  · Sore throat or hoarseness (usually temporary)  · Allergic reaction to the anesthetic  · Irregular heartbeat (rare)  · Cardiac arrest (rare)   Anesthesia safety  · Follow all instructions you are given for how long not to eat or drink before your procedure.  · Be sure your doctor knows what medicines and drugs you take. This includes over-the-counter medicines, herbs, supplements, alcohol or other drugs. You will be asked when those were last taken.  · Have an adult family member or friend drive you home after the procedure.  · For the first 24 hours after your surgery:  ? Do not drive or use heavy equipment.  ? Do not make important decisions or sign legal documents. If important decisions or signing legal documents is necessary during the first 24 hours after surgery, have a trusted family member or spouse act on your behalf.  ? Avoid alcohol.  ? Have a responsible adult stay with you. He or she can watch for problems and help keep you safe.  Date Last Reviewed: 12/1/2016  © 8501-9408 [employer]. [address] [place] [address], [place] [address]. All rights reserved. This information is not intended as a substitute for professional medical care. Always follow your healthcare professional's instructions

## 2020-06-04 NOTE — PROGRESS NOTES
"  Subjective:      Patient ID: Jeffery Melton is a 64 y.o. male.     Chief Complaint: No chief complaint on file.     HPI:  Jeffery Melton is a 64 y.o. male with a medical history of asthma, CAD, HLD, and HTN who presents to CTS via virtual visit for possible CABG. Patient had a [facility] cath perfomred in November of 2019 and was was told that he had lesion that were not stentable. Patient lives in [place] and was evaluated by CTS there however he would prefer to be seen in [place] since it is closer to his daughter.  He also states he would like a second option. Patient reports that he does have intermittent shortness and chest heaviness with physical activity.  He is able to ambulate without difficulty, appears to have good functional status.  He has a history of a "mild" stroke in 2003 but denies any deficits.  Patient does not drink ETOH or use tobacco.        Family and social history reviewed          Review of patient's allergies indicates:   Allergen Reactions    Penicillins             Past Medical History:   Diagnosis Date    Asthma      CAD (coronary artery disease)      Hyperlipidemia      Hypertension        No past surgical history on file.       Family History      Problem Relation (Age of Onset)     Hypertension Mother, Father          Social History               Socioeconomic History    Marital status:        Spouse name: Not on file    Number of children: Not on file    Years of education: Not on file    Highest education level: Not on file   Occupational History    Not on file   Social Needs    Financial resource strain: Not on file    Food insecurity:       Worry: Not on file       Inability: Not on file    Transportation needs:       Medical: Not on file       Non-medical: Not on file   Tobacco Use    Smoking status: Never Smoker    Smokeless tobacco: Never Used   Substance and Sexual Activity    Alcohol use: Not Currently    Drug use: Never    Sexual activity: Not on " file   Lifestyle    Physical activity:       Days per week: Not on file       Minutes per session: Not on file    Stress: Not on file   Relationships    Social connections:       Talks on phone: Not on file       Gets together: Not on file       Attends Gnosticism service: Not on file       Active member of club or organization: Not on file       Attends meetings of clubs or organizations: Not on file       Relationship status: Not on file   Other Topics Concern    Not on file   Social History Narrative    Not on file            Current Outpatient Medications on File Prior to Visit   Medication Sig Dispense Refill Last Dose    aspirin 81 MG Chew Take 81 mg by mouth.       atorvastatin (LIPITOR) 80 MG tablet        enalapril (VASOTEC) 20 MG tablet Take 20 mg by mouth.       hydroCHLOROthiazide (HYDRODIURIL) 25 MG tablet Take 25 mg by mouth.       loratadine (CLARITIN) 10 mg tablet Take 10 mg by mouth.       omega-3 fatty acids/fish oil (FISH OIL-OMEGA-3 FATTY ACIDS) 300-1,000 mg capsule Take 2 g by mouth.       pantoprazole (PROTONIX) 40 MG tablet Take 40 mg by mouth.          Current medications Reviewed     Review of Systems   Constitutional: Negative for activity change, appetite change, fatigue and fever.   HENT: Negative for nosebleeds.    Respiratory: Positive for shortness of breath. Negative for cough.    Cardiovascular: Positive for chest pain. Negative for palpitations and leg swelling.   Gastrointestinal: Negative for abdominal distention, abdominal pain and nausea.   Genitourinary: Negative for frequency.   Musculoskeletal: Negative for arthralgias and myalgias.   Skin: Negative for rash.   Neurological: Negative for dizziness and numbness.   Hematological: Does not bruise/bleed easily.      Objective:   Physical Exam: Unable to perform, patient was seen virtually     Diagnostic Results:   ECHO 3/5/2020  · Left Ventricle: Normal left ventricle cavity size. Normal wall motion.   Normal (55-65%)  ejection fraction. Mild concentric hypertrophy observed.   Grade I (mild) left ventricular diastolic dysfunction.  · Aortic Valve: The valve is tricuspid. The aortic valve is sclerotic   without reduced excursion. No aortic stenosis. No aortic regurgitation.  · Mitral Valve: Normal mitral valve structure. No mitral stenosis. No   mitral regurgitation.  · Right Ventricle: Normal right ventricle cavity size and ejection   fraction.  · Tricuspid Valve: Normal tricuspid valve structure. No tricuspid   regurgitation. No tricuspid stenosis.  · Pericardium: No pericardial effusion.     [facility] 11/11/2019    · 3 vessel coronary artery disease  · Normal LV function    Angiographic findings: There was heavy calcification of all coronary   arteries.    The left main coronary artery appears to have tubular narrowing of   approximately 40%.  No high-grade focal lesions were noted.    The left anterior descending artery had an ostial 40% stenosis.  There was   a proximal 80% stenosis before the takeoff of a very large diagonal   branch.  The LAD then became a very diminutive vessel or was even 100%   occluded after the diagonal branch.  The diagonal vessel had proximal 30%   disease present.    Left circumflex coronary artery had proximal 30% disease.  There were 2   marginal branches that were 100% occluded and filled via collaterals.    Based on collateral fill they appeared to be fairly small vessels but this   could be deceiving.    The right coronary artery was a large dominant vessel giving rise to a   large PDA and 1 posterior left ventricular branch.  There is proximal 70%   stenosis in the RCA.  The PDA and left ventricular branch had 60% lesions   present.    Hemodynamics LV pressure 110/13.  Aortic pressure 113/59.  There was no   gradient across the aortic valve on pullback.    LV gram: Overall LV function was normal with an estimated ejection   fraction of 60%.  There was a very small area of apical akinesis.    Comment:  Based on this study consideration can be made for bypass surgery   although the marginal branches may be too small to bypass.  If this were   the case consideration could be made for stenting of the proximal LAD 80%   stenosis or simply trying more aggressive medical therapy and getting a   nuclear stress test.  Further consultation will be made.  All diagnotics and labs reviewed.     STS Score: 1.4%  Assessment:   1. CAD  Plan:   CABG x2    CTS Attending Note:    I have personally taken the history and examined this patient and agree with the MARINA's note as stated above.  64-year-old gentleman with known coronary disease and some degree of dyspnea on exertion and chest tightness.  We plan coronary bypass grafting x2, with the possibility for 3rd graft depending on the findings at operation.  His questions have been answered, and he wishes to proceed.

## 2020-06-04 NOTE — ANESTHESIA PREPROCEDURE EVALUATION
[facility]  Anesthesia Pre-Operative Evaluation         Patient Name: Jeffery Melton  YOB: 1955  MRN: [id number]    SUBJECTIVE:     Pre-operative evaluation for Procedure(s) (LRB):  CORONARY ARTERY BYPASS GRAFT (CABG) x 2 (N/A)     06/04/2020    Jeffery Melton is a 64 y.o. male w/ a significant PMHx of asthma, CAD, HLD, and HTN who presents to [facility] via virtual visit for possible CABG. Patient had a [facility] cath perfomred in November of 2019 and was was told that he had lesion that were not stentable..    06/04/2020 TTE  · Concentric left ventricular remodeling.  · Normal left ventricular systolic function. The estimated ejection fraction is 65%.  · Septal wall has abnormal motion.  · Normal LV diastolic function.  · Normal right ventricular systolic function.  · The estimated PA systolic pressure is 21 mmHg.  · Normal central venous pressure (3 mmHg).      2019  [facility]    2019 November  [id number]/2019  [facility]  Component Name Value Ref Range   BSA 2.13 m2   Height (In) 70 in   FM CV ECHO WEIGHT 3,248 oz   Blood Pressure 142/83 mmHg   Other Result Information   This result has an attachment that is not available.   Result Narrative     · 3 vessel coronary artery disease  · Normal LV function    Procedure: Left heart catheterization, coronary arteriography, left   ventriculography  Indications: Chest pain and positive stress test  Procedure in detail: Informed consent was obtained from the patient in the   office.A timeout was performed indicating proper patient and proper   procedure proper site anesthesia was accomplished with intravenous Versed   and fentanyl.  Lidocaine was used.  The right femoral artery was entered   using standard Seldinger technique.  Coronary arteriography was performed   followed by left ventriculography  followed by manual pressure.    Angiographic findings: There was heavy calcification of all coronary   arteries.    The left main coronary artery appears to have tubular narrowing of   approximately 40%.  No high-grade focal lesions were noted.    The left anterior descending artery had an ostial 40% stenosis.  There was   a proximal 80% stenosis before the takeoff of a very large diagonal   branch.  The LAD then became a very diminutive vessel or was even 100%   occluded after the diagonal branch.  The diagonal vessel had proximal 30%   disease present.    Left circumflex coronary artery had proximal 30% disease.  There were 2   marginal branches that were 100% occluded and filled via collaterals.    Based on collateral fill they appeared to be fairly small vessels but this   could be deceiving.    The right coronary artery was a large dominant vessel giving rise to a   large PDA and 1 posterior left ventricular branch.  There is proximal 70%   stenosis in the RCA.  The PDA and left ventricular branch had 60% lesions   present.    Hemodynamics LV pressure 110/13.  Aortic pressure 113/59.  There was no   gradient across the aortic valve on pullback.    LV gram: Overall LV function was normal with an estimated ejection   fraction of 60%.  There was a very small area of apical akinesis.       Patient now presents for the above procedure(s).            Patient Active Problem List   Diagnosis    Atherosclerosis of native coronary artery with angina pectoris    Coronary artery disease involving native coronary artery with unstable angina pectoris    Pre-op exam       Review of patient's allergies indicates:   Allergen Reactions    Penicillins        Current Inpatient Medications:      No current facility-administered medications on file prior to encounter.      Current Outpatient Medications on File Prior to Encounter   Medication Sig Dispense Refill    aspirin 81 MG Chew Take 81 mg by mouth.      atorvastatin  (LIPITOR) 80 MG tablet       enalapril (VASOTEC) 20 MG tablet Take 20 mg by mouth.      hydroCHLOROthiazide (HYDRODIURIL) 25 MG tablet Take 25 mg by mouth.      loratadine (CLARITIN) 10 mg tablet Take 10 mg by mouth.      omega-3 fatty acids/fish oil (FISH OIL-OMEGA-3 FATTY ACIDS) 300-1,000 mg capsule Take 2 g by mouth.      pantoprazole (PROTONIX) 40 MG tablet Take 40 mg by mouth.         No past surgical history on file.    Social History     Socioeconomic History    Marital status:      Spouse name: Not on file    Number of children: Not on file    Years of education: Not on file    Highest education level: Not on file   Occupational History    Not on file   Social Needs    Financial resource strain: Not on file    Food insecurity:     Worry: Not on file     Inability: Not on file    Transportation needs:     Medical: Not on file     Non-medical: Not on file   Tobacco Use    Smoking status: Never Smoker    Smokeless tobacco: Never Used   Substance and Sexual Activity    Alcohol use: Not Currently    Drug use: Never    Sexual activity: Not on file   Lifestyle    Physical activity:     Days per week: Not on file     Minutes per session: Not on file    Stress: Not on file   Relationships    Social connections:     Talks on phone: Not on file     Gets together: Not on file     Attends Islam service: Not on file     Active member of club or organization: Not on file     Attends meetings of clubs or organizations: Not on file     Relationship status: Not on file   Other Topics Concern    Not on file   Social History Narrative    Not on file       OBJECTIVE:     Vital Signs Range (Last 24H):  Temp:  [36.7 °C (98 °F)]   Pulse:  [80-89]   Resp:  [16]   BP: (142)/(90)   SpO2:  [98 %]       Significant Labs:  Lab Results   Component Value Date    WBC 5.40 06/04/2020    HGB 14.3 06/04/2020    HCT 45.6 06/04/2020     06/04/2020    ALT 27 06/04/2020    AST 22 06/04/2020      06/04/2020    K 4.2 06/04/2020     06/04/2020    CREATININE 0.9 06/04/2020    BUN 20 06/04/2020    CO2 28 06/04/2020    INR 1.0 06/04/2020    HGBA1C 5.8 (H) 06/04/2020       Diagnostic Studies: No relevant studies.    EKG:   Results for orders placed or performed during the hospital encounter of 06/04/20   EKG 12-lead    Collection Time: 06/04/20 12:14 PM    Narrative    Test Reason : I25.110,    Vent. Rate : 091 BPM     Atrial Rate : 091 BPM     P-R Int : 160 ms          QRS Dur : 106 ms      QT Int : 366 ms       P-R-T Axes : 063 072 058 degrees     QTc Int : 450 ms    Normal sinus rhythm  Normal ECG  No previous ECGs available  Confirmed by Heidi Hull MD ([id number]) on 6/4/2020 12:35:36 PM    Referred By: NICCI PRAKASH           Confirmed By:Heidi Hull MD       2D ECHO:  TTE:  Results for orders placed or performed during the hospital encounter of 06/04/20   Echo Color Flow Doppler? Yes   Result Value Ref Range    Ascending aorta 3.36 cm    STJ 3.05 cm    AV mean gradient 3 mmHg    Ao peak noe 1.19 m/s    Ao VTI 18.98 cm    IVRT 99.90 msec    IVS 0.82 0.6 - 1.1 cm    LA size 2.96 cm    Left Atrium Major Axis 5.12 cm    Left Atrium Minor Axis 5.04 cm    LVIDD 4.75 3.5 - 6.0 cm    LVIDS 3.35 2.1 - 4.0 cm    LVOT diameter 2.33 cm    LVOT peak VTI 20.08 cm    PW 1.18 (A) 0.6 - 1.1 cm    MV Peak A Noe 0.82 m/s    E wave decelartion time 152.92 msec    MV Peak E Noe 0.58 m/s    PV Peak D Noe 0.33 m/s    PV Peak S Noe 0.25 m/s    RA Major Axis 4.53 cm    RA Width 3.47 cm    RVDD 3.75 cm    Sinus 3.82 cm    TAPSE 2.63 cm    TR Max Noe 2.13 m/s    TDI LATERAL 0.11 m/s    TDI SEPTAL 0.08 m/s    LA WIDTH 4.59 cm    LV Diastolic Volume 105.01 mL    LV Systolic Volume 45.67 mL    RV S' 14.07 cm/s    LVOT peak noe 1.10 m/s    LV LATERAL E/E' RATIO 5.27 m/s    LV SEPTAL E/E' RATIO 7.25 m/s    FS 29 %    LA volume 58.66 cm3    LV mass 167.31 g    Left Ventricle Relative Wall Thickness 0.50 cm    AV valve area 4.51 cm2     AV Velocity Ratio 0.92     AV index (prosthetic) 1.06     E/A ratio 0.71     Mean e' 0.10 m/s    Pulm vein S/D ratio 0.76     LVOT area 4.3 cm2    LVOT stroke volume 85.57 cm3    AV peak gradient 6 mmHg    E/E' ratio 6.11 m/s    LV Systolic Volume Index 20.6 mL/m2    LV Diastolic Volume Index 47.27 mL/m2    LA Volume Index 26.4 mL/m2    LV Mass Index 75 g/m2    Triscuspid Valve Regurgitation Peak Gradient 18 mmHg    BSA 2.27 m2    Right Atrial Pressure (from IVC) 3 mmHg    TV rest pulmonary artery pressure 21 mmHg    Narrative    · Concentric left ventricular remodeling.  · Normal left ventricular systolic function. The estimated ejection   fraction is 65%.  · Septal wall has abnormal motion.  · Normal LV diastolic function.  · Normal right ventricular systolic function.  · The estimated PA systolic pressure is 21 mmHg.  · Normal central venous pressure (3 mmHg).          KATHRYN:  No results found for this or any previous visit.    ASSESSMENT/PLAN:         Anesthesia Evaluation    I have reviewed the Patient Summary Reports.          Review of Systems  Anesthesia Hx:  No problems with previous Anesthesia  History of prior surgery of interest to airway management or planning: Previous anesthesia: General 2012-HERNIA REPAIR with general anesthesia.  Denies Family Hx of Anesthesia complications.   Denies Personal Hx of Anesthesia complications.   Social:  Non-Smoker, Social Alcohol Use    Hematology/Oncology:  Hematology Normal   Oncology Normal     EENT/Dental:EENT/Dental Normal   Cardiovascular:   Hypertension CAD   Angina, with exertion hyperlipidemia KRUEGER 3/5/20-ECHO EF 55-65%. GRADE 1 DIASTOLIC DYSFUNCTION. Functional Capacity good / => 4 METS  Coronary Artery Disease:  hx of Percutaneous Coronary Intervention (PCI).    Pulmonary:   Asthma moderate Denies Shortness of breath.  Denies Recent URI.  Asthma:  last episode was 1 - 12 months ago. Emergency visits this year is none.   Renal/:   renal calculi     Hepatic/GI:   GERD, well controlled PATIENT HAS OSTOMY Denies Esophageal / Stomach Disorders  Bowel Conditions:  Inflammatory Bowel Disease, Crohns  Hernia, Denies Abdominal Hernia   Musculoskeletal:  Musculoskeletal Normal    Neurological:  TIA - Transient Ischemic Attack , Most recent TIA was on 2003 , has had 1 TIA , transient deficits are no residual deficit.   Endocrine:  Metabolic Disorders, Morbid Obesity / BMI > 40  Psych:  Psychiatric Normal              Anesthesia Plan  Type of Anesthesia, risks & benefits discussed:  Anesthesia Type:  general  Patient's Preference:   Intra-op Monitoring Plan: cardiac output, standard ASA monitors, central line and arterial line  Intra-op Monitoring Plan Comments:   Post Op Pain Control Plan: multimodal analgesia, IV/PO Opioids PRN and per primary service following discharge from PACU  Post Op Pain Control Plan Comments:   Induction:   IV  Beta Blocker:  Patient is not currently on a Beta-Blocker (No further documentation required).     Beta Blocker Comments: MTP 25 mg PO to be given in preop  Informed Consent: Patient understands risks and agrees with Anesthesia plan.  Questions answered. Anesthesia consent signed with patient.  ASA Score: 4     Day of Surgery Review of History & Physical:    H&P update referred to the surgeon.         Ready For Surgery From Anesthesia Perspective.

## 2020-06-05 ENCOUNTER — HOSPITAL ENCOUNTER (INPATIENT)
Facility: HOSPITAL | Age: 65
LOS: 5 days | Discharge: HOME HEALTH | DRG: 236 | End: 2020-06-10
Attending: THORACIC SURGERY (CARDIOTHORACIC VASCULAR SURGERY) | Admitting: THORACIC SURGERY (CARDIOTHORACIC VASCULAR SURGERY)
Payer: COMMERCIAL

## 2020-06-05 ENCOUNTER — ANESTHESIA (OUTPATIENT)
Dept: SURGERY | Facility: HOSPITAL | Age: 65
DRG: 236 | End: 2020-06-05
Payer: COMMERCIAL

## 2020-06-05 DIAGNOSIS — R00.0 TACHYCARDIA: ICD-10-CM

## 2020-06-05 DIAGNOSIS — Z95.1 S/P CABG (CORONARY ARTERY BYPASS GRAFT): Primary | ICD-10-CM

## 2020-06-05 DIAGNOSIS — I25.119 ATHEROSCLEROSIS OF NATIVE CORONARY ARTERY OF NATIVE HEART WITH ANGINA PECTORIS: ICD-10-CM

## 2020-06-05 DIAGNOSIS — E66.09 CLASS 1 OBESITY DUE TO EXCESS CALORIES WITH SERIOUS COMORBIDITY AND BODY MASS INDEX (BMI) OF 31.0 TO 31.9 IN ADULT: ICD-10-CM

## 2020-06-05 DIAGNOSIS — R73.9 HYPERGLYCEMIA: ICD-10-CM

## 2020-06-05 LAB
ALLENS TEST: ABNORMAL
ANION GAP SERPL CALC-SCNC: 7 MMOL/L (ref 8–16)
APTT BLDCRRT: 28.6 SEC (ref 21–32)
BASOPHILS # BLD AUTO: 0.02 K/UL (ref 0–0.2)
BASOPHILS # BLD AUTO: 0.04 K/UL (ref 0–0.2)
BASOPHILS NFR BLD: 0.1 % (ref 0–1.9)
BASOPHILS NFR BLD: 0.2 % (ref 0–1.9)
BUN SERPL-MCNC: 20 MG/DL (ref 8–23)
CALCIUM SERPL-MCNC: 8.6 MG/DL (ref 8.7–10.5)
CHLORIDE SERPL-SCNC: 111 MMOL/L (ref 95–110)
CO2 SERPL-SCNC: 23 MMOL/L (ref 23–29)
CREAT SERPL-MCNC: 0.8 MG/DL (ref 0.5–1.4)
DELSYS: ABNORMAL
DIFFERENTIAL METHOD: ABNORMAL
DIFFERENTIAL METHOD: ABNORMAL
EOSINOPHIL # BLD AUTO: 0 K/UL (ref 0–0.5)
EOSINOPHIL # BLD AUTO: 0.1 K/UL (ref 0–0.5)
EOSINOPHIL NFR BLD: 0 % (ref 0–8)
EOSINOPHIL NFR BLD: 0.5 % (ref 0–8)
ERYTHROCYTE [DISTWIDTH] IN BLOOD BY AUTOMATED COUNT: 14 % (ref 11.5–14.5)
ERYTHROCYTE [DISTWIDTH] IN BLOOD BY AUTOMATED COUNT: 14.2 % (ref 11.5–14.5)
ERYTHROCYTE [SEDIMENTATION RATE] IN BLOOD BY WESTERGREN METHOD: 14 MM/H
ERYTHROCYTE [SEDIMENTATION RATE] IN BLOOD BY WESTERGREN METHOD: 20 MM/H
ERYTHROCYTE [SEDIMENTATION RATE] IN BLOOD BY WESTERGREN METHOD: 25 MM/H
EST. GFR  (AFRICAN AMERICAN): >60 ML/MIN/1.73 M^2
EST. GFR  (NON AFRICAN AMERICAN): >60 ML/MIN/1.73 M^2
FIO2: 100
FIO2: 40
FIO2: 50
FIO2: 60
FIO2: 65
GLUCOSE SERPL-MCNC: 134 MG/DL (ref 70–110)
GLUCOSE SERPL-MCNC: 151 MG/DL (ref 70–110)
GLUCOSE SERPL-MCNC: 162 MG/DL (ref 70–110)
GLUCOSE SERPL-MCNC: 165 MG/DL (ref 70–110)
GLUCOSE SERPL-MCNC: 173 MG/DL (ref 70–110)
GLUCOSE SERPL-MCNC: 174 MG/DL (ref 70–110)
HCO3 UR-SCNC: 19.4 MMOL/L (ref 24–28)
HCO3 UR-SCNC: 20.7 MMOL/L (ref 24–28)
HCO3 UR-SCNC: 21 MMOL/L (ref 24–28)
HCO3 UR-SCNC: 21.9 MMOL/L (ref 24–28)
HCO3 UR-SCNC: 22.4 MMOL/L (ref 24–28)
HCO3 UR-SCNC: 22.6 MMOL/L (ref 24–28)
HCO3 UR-SCNC: 23.4 MMOL/L (ref 24–28)
HCO3 UR-SCNC: 24.9 MMOL/L (ref 24–28)
HCO3 UR-SCNC: 25.6 MMOL/L (ref 24–28)
HCO3 UR-SCNC: 25.7 MMOL/L (ref 24–28)
HCO3 UR-SCNC: 26.4 MMOL/L (ref 24–28)
HCO3 UR-SCNC: 26.8 MMOL/L (ref 24–28)
HCO3 UR-SCNC: 27.8 MMOL/L (ref 24–28)
HCO3 UR-SCNC: 27.9 MMOL/L (ref 24–28)
HCO3 UR-SCNC: 27.9 MMOL/L (ref 24–28)
HCT VFR BLD AUTO: 32.3 % (ref 40–54)
HCT VFR BLD AUTO: 36.3 % (ref 40–54)
HCT VFR BLD CALC: 26 %PCV (ref 36–54)
HCT VFR BLD CALC: 28 %PCV (ref 36–54)
HCT VFR BLD CALC: 28 %PCV (ref 36–54)
HCT VFR BLD CALC: 29 %PCV (ref 36–54)
HCT VFR BLD CALC: 29 %PCV (ref 36–54)
HCT VFR BLD CALC: 30 %PCV (ref 36–54)
HCT VFR BLD CALC: 31 %PCV (ref 36–54)
HCT VFR BLD CALC: 32 %PCV (ref 36–54)
HCT VFR BLD CALC: 34 %PCV (ref 36–54)
HCT VFR BLD CALC: 34 %PCV (ref 36–54)
HCT VFR BLD CALC: 35 %PCV (ref 36–54)
HCT VFR BLD CALC: 39 %PCV (ref 36–54)
HGB BLD-MCNC: 10.4 G/DL (ref 14–18)
HGB BLD-MCNC: 11.9 G/DL (ref 14–18)
IMM GRANULOCYTES # BLD AUTO: 0.06 K/UL (ref 0–0.04)
IMM GRANULOCYTES # BLD AUTO: 0.1 K/UL (ref 0–0.04)
IMM GRANULOCYTES NFR BLD AUTO: 0.4 % (ref 0–0.5)
IMM GRANULOCYTES NFR BLD AUTO: 0.6 % (ref 0–0.5)
INR PPP: 1.1 (ref 0.8–1.2)
LDH SERPL L TO P-CCNC: 0.61 MMOL/L (ref 0.36–1.25)
LDH SERPL L TO P-CCNC: 1.32 MMOL/L (ref 0.5–2.2)
LDH SERPL L TO P-CCNC: 1.61 MMOL/L (ref 0.36–1.25)
LDH SERPL L TO P-CCNC: 2.01 MMOL/L (ref 0.36–1.25)
LDH SERPL L TO P-CCNC: 3.04 MMOL/L (ref 0.36–1.25)
LDH SERPL L TO P-CCNC: 3.59 MMOL/L (ref 0.36–1.25)
LDH SERPL L TO P-CCNC: 3.93 MMOL/L (ref 0.36–1.25)
LDH SERPL L TO P-CCNC: 4.1 MMOL/L (ref 0.36–1.25)
LDH SERPL L TO P-CCNC: 4.11 MMOL/L (ref 0.36–1.25)
LDH SERPL L TO P-CCNC: 4.57 MMOL/L (ref 0.36–1.25)
LYMPHOCYTES # BLD AUTO: 0.5 K/UL (ref 1–4.8)
LYMPHOCYTES # BLD AUTO: 0.9 K/UL (ref 1–4.8)
LYMPHOCYTES NFR BLD: 3.6 % (ref 18–48)
LYMPHOCYTES NFR BLD: 5.1 % (ref 18–48)
MAGNESIUM SERPL-MCNC: 2.6 MG/DL (ref 1.6–2.6)
MAGNESIUM SERPL-MCNC: 2.6 MG/DL (ref 1.6–2.6)
MCH RBC QN AUTO: 28.7 PG (ref 27–31)
MCH RBC QN AUTO: 28.8 PG (ref 27–31)
MCHC RBC AUTO-ENTMCNC: 32.2 G/DL (ref 32–36)
MCHC RBC AUTO-ENTMCNC: 32.8 G/DL (ref 32–36)
MCV RBC AUTO: 88 FL (ref 82–98)
MCV RBC AUTO: 89 FL (ref 82–98)
MODE: ABNORMAL
MONOCYTES # BLD AUTO: 1.7 K/UL (ref 0.3–1)
MONOCYTES # BLD AUTO: 1.9 K/UL (ref 0.3–1)
MONOCYTES NFR BLD: 10.6 % (ref 4–15)
MONOCYTES NFR BLD: 11.2 % (ref 4–15)
NEUTROPHILS # BLD AUTO: 12.7 K/UL (ref 1.8–7.7)
NEUTROPHILS # BLD AUTO: 14.6 K/UL (ref 1.8–7.7)
NEUTROPHILS NFR BLD: 83 % (ref 38–73)
NEUTROPHILS NFR BLD: 84.7 % (ref 38–73)
NRBC BLD-RTO: 0 /100 WBC
NRBC BLD-RTO: 0 /100 WBC
PCO2 BLDA: 34.8 MMHG (ref 35–45)
PCO2 BLDA: 35.4 MMHG (ref 35–45)
PCO2 BLDA: 37.4 MMHG (ref 35–45)
PCO2 BLDA: 37.8 MMHG (ref 35–45)
PCO2 BLDA: 37.9 MMHG (ref 35–45)
PCO2 BLDA: 38.8 MMHG (ref 35–45)
PCO2 BLDA: 38.9 MMHG (ref 35–45)
PCO2 BLDA: 39.4 MMHG (ref 35–45)
PCO2 BLDA: 40.6 MMHG (ref 35–45)
PCO2 BLDA: 44.2 MMHG (ref 35–45)
PCO2 BLDA: 48 MMHG (ref 35–45)
PCO2 BLDA: 48.3 MMHG (ref 35–45)
PCO2 BLDA: 49.6 MMHG (ref 35–45)
PCO2 BLDA: 51.7 MMHG (ref 35–45)
PCO2 BLDA: 51.8 MMHG (ref 35–45)
PEEP: 5
PEEP: 8
PH SMN: 7.28 [PH] (ref 7.35–7.45)
PH SMN: 7.3 [PH] (ref 7.35–7.45)
PH SMN: 7.34 [PH] (ref 7.35–7.45)
PH SMN: 7.35 [PH] (ref 7.35–7.45)
PH SMN: 7.36 [PH] (ref 7.35–7.45)
PH SMN: 7.37 [PH] (ref 7.35–7.45)
PH SMN: 7.38 [PH] (ref 7.35–7.45)
PH SMN: 7.39 [PH] (ref 7.35–7.45)
PH SMN: 7.44 [PH] (ref 7.35–7.45)
PH SMN: 7.49 [PH] (ref 7.35–7.45)
PHOSPHATE SERPL-MCNC: 3.7 MG/DL (ref 2.7–4.5)
PHOSPHATE SERPL-MCNC: 3.7 MG/DL (ref 2.7–4.5)
PLATELET # BLD AUTO: 220 K/UL (ref 150–350)
PLATELET # BLD AUTO: 247 K/UL (ref 150–350)
PMV BLD AUTO: 9.7 FL (ref 9.2–12.9)
PMV BLD AUTO: 9.7 FL (ref 9.2–12.9)
PO2 BLDA: 101 MMHG (ref 80–100)
PO2 BLDA: 102 MMHG (ref 80–100)
PO2 BLDA: 109 MMHG (ref 80–100)
PO2 BLDA: 151 MMHG (ref 80–100)
PO2 BLDA: 223 MMHG (ref 80–100)
PO2 BLDA: 239 MMHG (ref 80–100)
PO2 BLDA: 245 MMHG (ref 80–100)
PO2 BLDA: 255 MMHG (ref 80–100)
PO2 BLDA: 422 MMHG (ref 80–100)
PO2 BLDA: 48 MMHG (ref 40–60)
PO2 BLDA: 503 MMHG (ref 80–100)
PO2 BLDA: 524 MMHG (ref 80–100)
PO2 BLDA: 73 MMHG (ref 80–100)
PO2 BLDA: 78 MMHG (ref 80–100)
PO2 BLDA: 81 MMHG (ref 80–100)
POC BE: -1 MMOL/L
POC BE: -2 MMOL/L
POC BE: -3 MMOL/L
POC BE: -3 MMOL/L
POC BE: -4 MMOL/L
POC BE: -5 MMOL/L
POC BE: -5 MMOL/L
POC BE: -6 MMOL/L
POC BE: 0 MMOL/L
POC BE: 1 MMOL/L
POC BE: 1 MMOL/L
POC BE: 2 MMOL/L
POC BE: 2 MMOL/L
POC BE: 3 MMOL/L
POC BE: 4 MMOL/L
POC IONIZED CALCIUM: 1 MMOL/L (ref 1.06–1.42)
POC IONIZED CALCIUM: 1.1 MMOL/L (ref 1.06–1.42)
POC IONIZED CALCIUM: 1.13 MMOL/L (ref 1.06–1.42)
POC IONIZED CALCIUM: 1.17 MMOL/L (ref 1.06–1.42)
POC IONIZED CALCIUM: 1.19 MMOL/L (ref 1.06–1.42)
POC IONIZED CALCIUM: 1.19 MMOL/L (ref 1.06–1.42)
POC IONIZED CALCIUM: 1.21 MMOL/L (ref 1.06–1.42)
POC IONIZED CALCIUM: 1.21 MMOL/L (ref 1.06–1.42)
POC IONIZED CALCIUM: 1.24 MMOL/L (ref 1.06–1.42)
POC IONIZED CALCIUM: 1.26 MMOL/L (ref 1.06–1.42)
POC IONIZED CALCIUM: 1.3 MMOL/L (ref 1.06–1.42)
POC IONIZED CALCIUM: 1.32 MMOL/L (ref 1.06–1.42)
POC SATURATED O2: 100 % (ref 95–100)
POC SATURATED O2: 83 % (ref 95–100)
POC SATURATED O2: 93 % (ref 95–100)
POC SATURATED O2: 95 % (ref 95–100)
POC SATURATED O2: 95 % (ref 95–100)
POC SATURATED O2: 97 % (ref 95–100)
POC SATURATED O2: 98 % (ref 95–100)
POC SATURATED O2: 98 % (ref 95–100)
POC SATURATED O2: 99 % (ref 95–100)
POC TCO2: 21 MMOL/L (ref 23–27)
POC TCO2: 22 MMOL/L (ref 23–27)
POC TCO2: 22 MMOL/L (ref 23–27)
POC TCO2: 23 MMOL/L (ref 23–27)
POC TCO2: 24 MMOL/L (ref 23–27)
POC TCO2: 24 MMOL/L (ref 23–27)
POC TCO2: 25 MMOL/L (ref 24–29)
POC TCO2: 26 MMOL/L (ref 23–27)
POC TCO2: 27 MMOL/L (ref 23–27)
POC TCO2: 27 MMOL/L (ref 23–27)
POC TCO2: 28 MMOL/L (ref 23–27)
POC TCO2: 28 MMOL/L (ref 23–27)
POC TCO2: 29 MMOL/L (ref 23–27)
POCT GLUCOSE: 142 MG/DL (ref 70–110)
POCT GLUCOSE: 151 MG/DL (ref 70–110)
POCT GLUCOSE: 153 MG/DL (ref 70–110)
POCT GLUCOSE: 169 MG/DL (ref 70–110)
POCT GLUCOSE: 176 MG/DL (ref 70–110)
POCT GLUCOSE: 179 MG/DL (ref 70–110)
POCT GLUCOSE: 182 MG/DL (ref 70–110)
POCT GLUCOSE: 185 MG/DL (ref 70–110)
POCT GLUCOSE: 202 MG/DL (ref 70–110)
POTASSIUM BLD-SCNC: 3.7 MMOL/L (ref 3.5–5.1)
POTASSIUM BLD-SCNC: 3.8 MMOL/L (ref 3.5–5.1)
POTASSIUM BLD-SCNC: 3.9 MMOL/L (ref 3.5–5.1)
POTASSIUM BLD-SCNC: 4.1 MMOL/L (ref 3.5–5.1)
POTASSIUM BLD-SCNC: 4.2 MMOL/L (ref 3.5–5.1)
POTASSIUM BLD-SCNC: 4.3 MMOL/L (ref 3.5–5.1)
POTASSIUM BLD-SCNC: 4.4 MMOL/L (ref 3.5–5.1)
POTASSIUM BLD-SCNC: 4.8 MMOL/L (ref 3.5–5.1)
POTASSIUM BLD-SCNC: 5.1 MMOL/L (ref 3.5–5.1)
POTASSIUM SERPL-SCNC: 4.2 MMOL/L (ref 3.5–5.1)
POTASSIUM SERPL-SCNC: 4.5 MMOL/L (ref 3.5–5.1)
PROTHROMBIN TIME: 11.5 SEC (ref 9–12.5)
PROVIDER CREDENTIALS: ABNORMAL
PROVIDER CREDENTIALS: ABNORMAL
PROVIDER NOTIFIED: ABNORMAL
PROVIDER NOTIFIED: ABNORMAL
RBC # BLD AUTO: 3.63 M/UL (ref 4.6–6.2)
RBC # BLD AUTO: 4.13 M/UL (ref 4.6–6.2)
SAMPLE: ABNORMAL
SITE: ABNORMAL
SODIUM BLD-SCNC: 136 MMOL/L (ref 136–145)
SODIUM BLD-SCNC: 137 MMOL/L (ref 136–145)
SODIUM BLD-SCNC: 138 MMOL/L (ref 136–145)
SODIUM BLD-SCNC: 140 MMOL/L (ref 136–145)
SODIUM BLD-SCNC: 141 MMOL/L (ref 136–145)
SODIUM BLD-SCNC: 142 MMOL/L (ref 136–145)
SODIUM BLD-SCNC: 143 MMOL/L (ref 136–145)
SODIUM BLD-SCNC: 144 MMOL/L (ref 136–145)
SODIUM BLD-SCNC: 145 MMOL/L (ref 136–145)
SODIUM SERPL-SCNC: 141 MMOL/L (ref 136–145)
SP02: 100
TIME NOTIFIED: 1520
TIME NOTIFIED: 1520
VERBAL RESULT READBACK PERFORMED: YES
VERBAL RESULT READBACK PERFORMED: YES
VT: 480
VT: 480
VT: 500
WBC # BLD AUTO: 15 K/UL (ref 3.9–12.7)
WBC # BLD AUTO: 17.59 K/UL (ref 3.9–12.7)

## 2020-06-05 PROCEDURE — 36620 ARTERIAL: ICD-10-PCS | Mod: 59,,, | Performed by: ANESTHESIOLOGY

## 2020-06-05 PROCEDURE — 94761 N-INVAS EAR/PLS OXIMETRY MLT: CPT

## 2020-06-05 PROCEDURE — 84132 ASSAY OF SERUM POTASSIUM: CPT

## 2020-06-05 PROCEDURE — 84295 ASSAY OF SERUM SODIUM: CPT

## 2020-06-05 PROCEDURE — 85610 PROTHROMBIN TIME: CPT

## 2020-06-05 PROCEDURE — 80048 BASIC METABOLIC PNL TOTAL CA: CPT

## 2020-06-05 PROCEDURE — 93312 ECHO TRANSESOPHAGEAL: CPT | Mod: 26,59,, | Performed by: ANESTHESIOLOGY

## 2020-06-05 PROCEDURE — 37799 UNLISTED PX VASCULAR SURGERY: CPT

## 2020-06-05 PROCEDURE — 99291 CRITICAL CARE FIRST HOUR: CPT | Mod: ,,, | Performed by: ANESTHESIOLOGY

## 2020-06-05 PROCEDURE — 25000003 PHARM REV CODE 250: Performed by: STUDENT IN AN ORGANIZED HEALTH CARE EDUCATION/TRAINING PROGRAM

## 2020-06-05 PROCEDURE — 27200953 HC CARDIOPLEGIA SYSTEM

## 2020-06-05 PROCEDURE — 64450 PR NERVE BLOCK INJ, ANES/STEROID, OTHER PERIPHERAL: ICD-10-PCS | Mod: 59,RT,, | Performed by: ANESTHESIOLOGY

## 2020-06-05 PROCEDURE — 27000191 HC C-V MONITORING

## 2020-06-05 PROCEDURE — 25000003 PHARM REV CODE 250: Performed by: NURSE PRACTITIONER

## 2020-06-05 PROCEDURE — 76937 SWAN GANZ LINE: ICD-10-PCS | Mod: 26,,, | Performed by: ANESTHESIOLOGY

## 2020-06-05 PROCEDURE — 27100088 HC CELL SAVER

## 2020-06-05 PROCEDURE — 37000008 HC ANESTHESIA 1ST 15 MINUTES: Performed by: THORACIC SURGERY (CARDIOTHORACIC VASCULAR SURGERY)

## 2020-06-05 PROCEDURE — P9045 ALBUMIN (HUMAN), 5%, 250 ML: HCPCS | Mod: JG | Performed by: STUDENT IN AN ORGANIZED HEALTH CARE EDUCATION/TRAINING PROGRAM

## 2020-06-05 PROCEDURE — 63600175 PHARM REV CODE 636 W HCPCS: Performed by: STUDENT IN AN ORGANIZED HEALTH CARE EDUCATION/TRAINING PROGRAM

## 2020-06-05 PROCEDURE — 36592 COLLECT BLOOD FROM PICC: CPT

## 2020-06-05 PROCEDURE — 93503 INSERT/PLACE HEART CATHETER: CPT | Mod: 59,,, | Performed by: ANESTHESIOLOGY

## 2020-06-05 PROCEDURE — 86920 COMPATIBILITY TEST SPIN: CPT

## 2020-06-05 PROCEDURE — 85520 HEPARIN ASSAY: CPT

## 2020-06-05 PROCEDURE — 63600175 PHARM REV CODE 636 W HCPCS: Performed by: THORACIC SURGERY (CARDIOTHORACIC VASCULAR SURGERY)

## 2020-06-05 PROCEDURE — 99223 1ST HOSP IP/OBS HIGH 75: CPT | Mod: ,,, | Performed by: NURSE PRACTITIONER

## 2020-06-05 PROCEDURE — 63600175 PHARM REV CODE 636 W HCPCS: Mod: JG | Performed by: STUDENT IN AN ORGANIZED HEALTH CARE EDUCATION/TRAINING PROGRAM

## 2020-06-05 PROCEDURE — 36000713 HC OR TIME LEV V EA ADD 15 MIN: Performed by: THORACIC SURGERY (CARDIOTHORACIC VASCULAR SURGERY)

## 2020-06-05 PROCEDURE — 25000003 PHARM REV CODE 250: Performed by: ANESTHESIOLOGY

## 2020-06-05 PROCEDURE — 63600175 PHARM REV CODE 636 W HCPCS: Performed by: NURSE PRACTITIONER

## 2020-06-05 PROCEDURE — 93005 ELECTROCARDIOGRAM TRACING: CPT

## 2020-06-05 PROCEDURE — 93312 TEE: ICD-10-PCS | Mod: 26,59,, | Performed by: ANESTHESIOLOGY

## 2020-06-05 PROCEDURE — 85025 COMPLETE CBC W/AUTO DIFF WBC: CPT | Mod: 91

## 2020-06-05 PROCEDURE — 76937 US GUIDE VASCULAR ACCESS: CPT | Mod: 26,,, | Performed by: ANESTHESIOLOGY

## 2020-06-05 PROCEDURE — 27201423 OPTIME MED/SURG SUP & DEVICES STERILE SUPPLY: Performed by: THORACIC SURGERY (CARDIOTHORACIC VASCULAR SURGERY)

## 2020-06-05 PROCEDURE — 82803 BLOOD GASES ANY COMBINATION: CPT

## 2020-06-05 PROCEDURE — 94640 AIRWAY INHALATION TREATMENT: CPT

## 2020-06-05 PROCEDURE — 33533 PR CABG, ARTERIAL, SINGLE: ICD-10-PCS | Mod: ,,, | Performed by: THORACIC SURGERY (CARDIOTHORACIC VASCULAR SURGERY)

## 2020-06-05 PROCEDURE — 63600175 PHARM REV CODE 636 W HCPCS: Mod: JG | Performed by: NURSE PRACTITIONER

## 2020-06-05 PROCEDURE — 25000003 PHARM REV CODE 250: Performed by: THORACIC SURGERY (CARDIOTHORACIC VASCULAR SURGERY)

## 2020-06-05 PROCEDURE — 27201037 HC PRESSURE MONITORING SET UP

## 2020-06-05 PROCEDURE — 33533 CABG ARTERIAL SINGLE: CPT | Mod: ,,, | Performed by: THORACIC SURGERY (CARDIOTHORACIC VASCULAR SURGERY)

## 2020-06-05 PROCEDURE — 82330 ASSAY OF CALCIUM: CPT

## 2020-06-05 PROCEDURE — 99223 PR INITIAL HOSPITAL CARE,LEVL III: ICD-10-PCS | Mod: ,,, | Performed by: NURSE PRACTITIONER

## 2020-06-05 PROCEDURE — 94002 VENT MGMT INPAT INIT DAY: CPT

## 2020-06-05 PROCEDURE — C1729 CATH, DRAINAGE: HCPCS | Performed by: THORACIC SURGERY (CARDIOTHORACIC VASCULAR SURGERY)

## 2020-06-05 PROCEDURE — 94010 BREATHING CAPACITY TEST: CPT

## 2020-06-05 PROCEDURE — 36620 INSERTION CATHETER ARTERY: CPT | Mod: 59,,, | Performed by: ANESTHESIOLOGY

## 2020-06-05 PROCEDURE — 33508 PR ENDOSCOPY W/VIDEO-ASST VEIN HARVEST,CABG: ICD-10-PCS | Mod: ,,, | Performed by: THORACIC SURGERY (CARDIOTHORACIC VASCULAR SURGERY)

## 2020-06-05 PROCEDURE — 37000009 HC ANESTHESIA EA ADD 15 MINS: Performed by: THORACIC SURGERY (CARDIOTHORACIC VASCULAR SURGERY)

## 2020-06-05 PROCEDURE — 36000712 HC OR TIME LEV V 1ST 15 MIN: Performed by: THORACIC SURGERY (CARDIOTHORACIC VASCULAR SURGERY)

## 2020-06-05 PROCEDURE — 83735 ASSAY OF MAGNESIUM: CPT

## 2020-06-05 PROCEDURE — 36600 WITHDRAWAL OF ARTERIAL BLOOD: CPT

## 2020-06-05 PROCEDURE — 33518 CABG ARTERY-VEIN TWO: CPT | Mod: ,,, | Performed by: THORACIC SURGERY (CARDIOTHORACIC VASCULAR SURGERY)

## 2020-06-05 PROCEDURE — 27000445 HC TEMPORARY PACEMAKER LEADS

## 2020-06-05 PROCEDURE — 93503 SWAN GANZ LINE: ICD-10-PCS | Mod: 59,,, | Performed by: ANESTHESIOLOGY

## 2020-06-05 PROCEDURE — 99900035 HC TECH TIME PER 15 MIN (STAT)

## 2020-06-05 PROCEDURE — 99291 PR CRITICAL CARE, E/M 30-74 MINUTES: ICD-10-PCS | Mod: ,,, | Performed by: ANESTHESIOLOGY

## 2020-06-05 PROCEDURE — 84100 ASSAY OF PHOSPHORUS: CPT

## 2020-06-05 PROCEDURE — 99900026 HC AIRWAY MAINTENANCE (STAT)

## 2020-06-05 PROCEDURE — 27000175 HC ADULT BYPASS PUMP

## 2020-06-05 PROCEDURE — D9220A PRA ANESTHESIA: ICD-10-PCS | Mod: ,,, | Performed by: ANESTHESIOLOGY

## 2020-06-05 PROCEDURE — 27000221 HC OXYGEN, UP TO 24 HOURS

## 2020-06-05 PROCEDURE — 93010 ELECTROCARDIOGRAM REPORT: CPT | Mod: ,,, | Performed by: INTERNAL MEDICINE

## 2020-06-05 PROCEDURE — C9113 INJ PANTOPRAZOLE SODIUM, VIA: HCPCS | Performed by: NURSE PRACTITIONER

## 2020-06-05 PROCEDURE — 33518 PR CABG, ARTERY-VEIN, TWO: ICD-10-PCS | Mod: ,,, | Performed by: THORACIC SURGERY (CARDIOTHORACIC VASCULAR SURGERY)

## 2020-06-05 PROCEDURE — 64450 NJX AA&/STRD OTHER PN/BRANCH: CPT | Mod: 59,LT,, | Performed by: ANESTHESIOLOGY

## 2020-06-05 PROCEDURE — P9045 ALBUMIN (HUMAN), 5%, 250 ML: HCPCS | Mod: JG | Performed by: NURSE PRACTITIONER

## 2020-06-05 PROCEDURE — 93010 EKG 12-LEAD: ICD-10-PCS | Mod: ,,, | Performed by: INTERNAL MEDICINE

## 2020-06-05 PROCEDURE — 85730 THROMBOPLASTIN TIME PARTIAL: CPT

## 2020-06-05 PROCEDURE — 85014 HEMATOCRIT: CPT

## 2020-06-05 PROCEDURE — 64450 NJX AA&/STRD OTHER PN/BRANCH: CPT | Mod: 59,RT,, | Performed by: ANESTHESIOLOGY

## 2020-06-05 PROCEDURE — 33508 ENDOSCOPIC VEIN HARVEST: CPT | Mod: ,,, | Performed by: THORACIC SURGERY (CARDIOTHORACIC VASCULAR SURGERY)

## 2020-06-05 PROCEDURE — 25000242 PHARM REV CODE 250 ALT 637 W/ HCPCS: Performed by: NURSE PRACTITIONER

## 2020-06-05 PROCEDURE — D9220A PRA ANESTHESIA: Mod: ,,, | Performed by: ANESTHESIOLOGY

## 2020-06-05 PROCEDURE — 83605 ASSAY OF LACTIC ACID: CPT

## 2020-06-05 PROCEDURE — 20000000 HC ICU ROOM

## 2020-06-05 RX ORDER — ATORVASTATIN CALCIUM 20 MG/1
80 TABLET, FILM COATED ORAL DAILY
Status: DISCONTINUED | OUTPATIENT
Start: 2020-06-06 | End: 2020-06-10 | Stop reason: HOSPADM

## 2020-06-05 RX ORDER — OXYCODONE HYDROCHLORIDE 5 MG/1
5 TABLET ORAL EVERY 4 HOURS PRN
Status: DISCONTINUED | OUTPATIENT
Start: 2020-06-05 | End: 2020-06-10 | Stop reason: HOSPADM

## 2020-06-05 RX ORDER — MUPIROCIN 20 MG/G
1 OINTMENT TOPICAL 2 TIMES DAILY
Status: DISCONTINUED | OUTPATIENT
Start: 2020-06-05 | End: 2020-06-10 | Stop reason: HOSPADM

## 2020-06-05 RX ORDER — FENTANYL CITRATE 50 UG/ML
25 INJECTION, SOLUTION INTRAMUSCULAR; INTRAVENOUS
Status: DISCONTINUED | OUTPATIENT
Start: 2020-06-05 | End: 2020-06-06

## 2020-06-05 RX ORDER — IPRATROPIUM BROMIDE AND ALBUTEROL SULFATE 2.5; .5 MG/3ML; MG/3ML
3 SOLUTION RESPIRATORY (INHALATION) EVERY 4 HOURS PRN
Status: ACTIVE | OUTPATIENT
Start: 2020-06-05 | End: 2020-06-06

## 2020-06-05 RX ORDER — ALBUMIN HUMAN 50 G/1000ML
25 SOLUTION INTRAVENOUS ONCE
Status: COMPLETED | OUTPATIENT
Start: 2020-06-05 | End: 2020-06-05

## 2020-06-05 RX ORDER — KETAMINE HYDROCHLORIDE 100 MG/ML
INJECTION, SOLUTION INTRAMUSCULAR; INTRAVENOUS
Status: DISCONTINUED | OUTPATIENT
Start: 2020-06-05 | End: 2020-06-05

## 2020-06-05 RX ORDER — POTASSIUM CHLORIDE 29.8 MG/ML
40 INJECTION INTRAVENOUS
Status: DISCONTINUED | OUTPATIENT
Start: 2020-06-05 | End: 2020-06-07

## 2020-06-05 RX ORDER — LIDOCAINE HYDROCHLORIDE 10 MG/ML
1 INJECTION, SOLUTION EPIDURAL; INFILTRATION; INTRACAUDAL; PERINEURAL
Status: COMPLETED | OUTPATIENT
Start: 2020-06-05 | End: 2020-06-05

## 2020-06-05 RX ORDER — ACETAMINOPHEN 10 MG/ML
1000 INJECTION, SOLUTION INTRAVENOUS EVERY 8 HOURS
Status: COMPLETED | OUTPATIENT
Start: 2020-06-05 | End: 2020-06-06

## 2020-06-05 RX ORDER — IPRATROPIUM BROMIDE AND ALBUTEROL SULFATE 2.5; .5 MG/3ML; MG/3ML
3 SOLUTION RESPIRATORY (INHALATION) EVERY 4 HOURS
Status: COMPLETED | OUTPATIENT
Start: 2020-06-05 | End: 2020-06-06

## 2020-06-05 RX ORDER — ONDANSETRON 2 MG/ML
INJECTION INTRAMUSCULAR; INTRAVENOUS
Status: DISCONTINUED | OUTPATIENT
Start: 2020-06-05 | End: 2020-06-05

## 2020-06-05 RX ORDER — ASPIRIN 325 MG
325 TABLET ORAL DAILY
Status: DISCONTINUED | OUTPATIENT
Start: 2020-06-06 | End: 2020-06-08

## 2020-06-05 RX ORDER — OXYCODONE HYDROCHLORIDE 5 MG/1
10 TABLET ORAL EVERY 4 HOURS PRN
Status: DISCONTINUED | OUTPATIENT
Start: 2020-06-05 | End: 2020-06-10 | Stop reason: HOSPADM

## 2020-06-05 RX ORDER — METOPROLOL TARTRATE 25 MG/1
25 TABLET, FILM COATED ORAL
Status: COMPLETED | OUTPATIENT
Start: 2020-06-05 | End: 2020-06-05

## 2020-06-05 RX ORDER — SODIUM CHLORIDE 9 MG/ML
INJECTION, SOLUTION INTRAVENOUS CONTINUOUS
Status: DISCONTINUED | OUTPATIENT
Start: 2020-06-05 | End: 2020-06-05

## 2020-06-05 RX ORDER — BACITRACIN 50000 [IU]/1
INJECTION, POWDER, FOR SOLUTION INTRAMUSCULAR
Status: DISCONTINUED | OUTPATIENT
Start: 2020-06-05 | End: 2020-06-05 | Stop reason: HOSPADM

## 2020-06-05 RX ORDER — FENTANYL CITRATE 50 UG/ML
INJECTION, SOLUTION INTRAMUSCULAR; INTRAVENOUS
Status: DISCONTINUED | OUTPATIENT
Start: 2020-06-05 | End: 2020-06-05

## 2020-06-05 RX ORDER — MAGNESIUM SULFATE HEPTAHYDRATE 40 MG/ML
4 INJECTION, SOLUTION INTRAVENOUS
Status: DISCONTINUED | OUTPATIENT
Start: 2020-06-05 | End: 2020-06-07

## 2020-06-05 RX ORDER — PHENYLEPHRINE HYDROCHLORIDE 10 MG/ML
INJECTION INTRAVENOUS
Status: DISCONTINUED | OUTPATIENT
Start: 2020-06-05 | End: 2020-06-05

## 2020-06-05 RX ORDER — MAGNESIUM SULFATE HEPTAHYDRATE 40 MG/ML
2 INJECTION, SOLUTION INTRAVENOUS
Status: DISCONTINUED | OUTPATIENT
Start: 2020-06-05 | End: 2020-06-07

## 2020-06-05 RX ORDER — ASPIRIN 325 MG
325 TABLET ORAL ONCE
Status: DISCONTINUED | OUTPATIENT
Start: 2020-06-05 | End: 2020-06-05

## 2020-06-05 RX ORDER — PROPOFOL 10 MG/ML
5 INJECTION, EMULSION INTRAVENOUS CONTINUOUS
Status: DISCONTINUED | OUTPATIENT
Start: 2020-06-05 | End: 2020-06-06

## 2020-06-05 RX ORDER — LIDOCAINE HCL/PF 100 MG/5ML
SYRINGE (ML) INTRAVENOUS
Status: DISCONTINUED | OUTPATIENT
Start: 2020-06-05 | End: 2020-06-05

## 2020-06-05 RX ORDER — DOCUSATE SODIUM 100 MG/1
100 CAPSULE, LIQUID FILLED ORAL 2 TIMES DAILY
Status: DISCONTINUED | OUTPATIENT
Start: 2020-06-05 | End: 2020-06-10 | Stop reason: HOSPADM

## 2020-06-05 RX ORDER — SODIUM CHLORIDE 0.9 % (FLUSH) 0.9 %
10 SYRINGE (ML) INJECTION
Status: DISCONTINUED | OUTPATIENT
Start: 2020-06-05 | End: 2020-06-10 | Stop reason: HOSPADM

## 2020-06-05 RX ORDER — DEXTROSE MONOHYDRATE, SODIUM CHLORIDE, AND POTASSIUM CHLORIDE 50; 1.49; 4.5 G/1000ML; G/1000ML; G/1000ML
INJECTION, SOLUTION INTRAVENOUS CONTINUOUS
Status: DISCONTINUED | OUTPATIENT
Start: 2020-06-05 | End: 2020-06-06

## 2020-06-05 RX ORDER — BISACODYL 10 MG
10 SUPPOSITORY, RECTAL RECTAL EVERY 12 HOURS PRN
Status: DISCONTINUED | OUTPATIENT
Start: 2020-06-05 | End: 2020-06-10 | Stop reason: HOSPADM

## 2020-06-05 RX ORDER — PAPAVERINE HYDROCHLORIDE 30 MG/ML
INJECTION INTRAMUSCULAR; INTRAVENOUS
Status: DISCONTINUED | OUTPATIENT
Start: 2020-06-05 | End: 2020-06-05 | Stop reason: HOSPADM

## 2020-06-05 RX ORDER — POTASSIUM CHLORIDE 14.9 MG/ML
20 INJECTION INTRAVENOUS
Status: DISCONTINUED | OUTPATIENT
Start: 2020-06-05 | End: 2020-06-07

## 2020-06-05 RX ORDER — ALBUMIN HUMAN 50 G/1000ML
500 SOLUTION INTRAVENOUS ONCE AS NEEDED
Status: COMPLETED | OUTPATIENT
Start: 2020-06-05 | End: 2020-06-05

## 2020-06-05 RX ORDER — MIDAZOLAM HYDROCHLORIDE 1 MG/ML
INJECTION, SOLUTION INTRAMUSCULAR; INTRAVENOUS
Status: DISCONTINUED | OUTPATIENT
Start: 2020-06-05 | End: 2020-06-05

## 2020-06-05 RX ORDER — ALBUMIN HUMAN 50 G/1000ML
25 SOLUTION INTRAVENOUS ONCE
Status: COMPLETED | OUTPATIENT
Start: 2020-06-05 | End: 2020-06-06

## 2020-06-05 RX ORDER — ASPIRIN 325 MG
325 TABLET ORAL ONCE
Status: COMPLETED | OUTPATIENT
Start: 2020-06-05 | End: 2020-06-05

## 2020-06-05 RX ORDER — METOCLOPRAMIDE HYDROCHLORIDE 5 MG/ML
5 INJECTION INTRAMUSCULAR; INTRAVENOUS EVERY 6 HOURS PRN
Status: DISCONTINUED | OUTPATIENT
Start: 2020-06-05 | End: 2020-06-10 | Stop reason: HOSPADM

## 2020-06-05 RX ORDER — ASPIRIN 300 MG/1
300 SUPPOSITORY RECTAL ONCE AS NEEDED
Status: DISCONTINUED | OUTPATIENT
Start: 2020-06-05 | End: 2020-06-08

## 2020-06-05 RX ORDER — ONDANSETRON 2 MG/ML
4 INJECTION INTRAMUSCULAR; INTRAVENOUS EVERY 12 HOURS PRN
Status: DISCONTINUED | OUTPATIENT
Start: 2020-06-05 | End: 2020-06-10 | Stop reason: HOSPADM

## 2020-06-05 RX ORDER — ROCURONIUM BROMIDE 10 MG/ML
INJECTION, SOLUTION INTRAVENOUS
Status: DISCONTINUED | OUTPATIENT
Start: 2020-06-05 | End: 2020-06-05

## 2020-06-05 RX ORDER — NICARDIPINE HYDROCHLORIDE 0.2 MG/ML
INJECTION INTRAVENOUS CONTINUOUS PRN
Status: DISCONTINUED | OUTPATIENT
Start: 2020-06-05 | End: 2020-06-05

## 2020-06-05 RX ORDER — HEPARIN SODIUM 1000 [USP'U]/ML
INJECTION, SOLUTION INTRAVENOUS; SUBCUTANEOUS
Status: DISCONTINUED | OUTPATIENT
Start: 2020-06-05 | End: 2020-06-05

## 2020-06-05 RX ORDER — PANTOPRAZOLE SODIUM 40 MG/10ML
40 INJECTION, POWDER, LYOPHILIZED, FOR SOLUTION INTRAVENOUS DAILY
Status: DISCONTINUED | OUTPATIENT
Start: 2020-06-05 | End: 2020-06-08

## 2020-06-05 RX ORDER — NOREPINEPHRINE BITARTRATE/D5W 4MG/250ML
0.02 PLASTIC BAG, INJECTION (ML) INTRAVENOUS CONTINUOUS
Status: DISCONTINUED | OUTPATIENT
Start: 2020-06-05 | End: 2020-06-06

## 2020-06-05 RX ORDER — DEXMEDETOMIDINE HYDROCHLORIDE 4 UG/ML
0.2 INJECTION, SOLUTION INTRAVENOUS CONTINUOUS
Status: DISCONTINUED | OUTPATIENT
Start: 2020-06-05 | End: 2020-06-06

## 2020-06-05 RX ORDER — POTASSIUM CHLORIDE 14.9 MG/ML
60 INJECTION INTRAVENOUS
Status: DISCONTINUED | OUTPATIENT
Start: 2020-06-05 | End: 2020-06-07

## 2020-06-05 RX ORDER — MUPIROCIN 20 MG/G
1 OINTMENT TOPICAL
Status: COMPLETED | OUTPATIENT
Start: 2020-06-05 | End: 2020-06-05

## 2020-06-05 RX ORDER — PROPOFOL 10 MG/ML
VIAL (ML) INTRAVENOUS
Status: DISCONTINUED | OUTPATIENT
Start: 2020-06-05 | End: 2020-06-05

## 2020-06-05 RX ORDER — PROTAMINE SULFATE 10 MG/ML
INJECTION, SOLUTION INTRAVENOUS
Status: DISCONTINUED | OUTPATIENT
Start: 2020-06-05 | End: 2020-06-05

## 2020-06-05 RX ORDER — NICARDIPINE HYDROCHLORIDE 0.2 MG/ML
1 INJECTION INTRAVENOUS CONTINUOUS
Status: DISCONTINUED | OUTPATIENT
Start: 2020-06-05 | End: 2020-06-06

## 2020-06-05 RX ORDER — ATORVASTATIN CALCIUM 20 MG/1
80 TABLET, FILM COATED ORAL DAILY
Status: DISCONTINUED | OUTPATIENT
Start: 2020-06-05 | End: 2020-06-05

## 2020-06-05 RX ORDER — ACETAMINOPHEN 325 MG/1
650 TABLET ORAL EVERY 4 HOURS PRN
Status: DISCONTINUED | OUTPATIENT
Start: 2020-06-05 | End: 2020-06-07

## 2020-06-05 RX ORDER — TRANEXAMIC ACID 100 MG/ML
INJECTION, SOLUTION INTRAVENOUS CONTINUOUS PRN
Status: DISCONTINUED | OUTPATIENT
Start: 2020-06-05 | End: 2020-06-05

## 2020-06-05 RX ORDER — POLYETHYLENE GLYCOL 3350 17 G/17G
17 POWDER, FOR SOLUTION ORAL DAILY
Status: DISCONTINUED | OUTPATIENT
Start: 2020-06-05 | End: 2020-06-10 | Stop reason: HOSPADM

## 2020-06-05 RX ORDER — FENTANYL CITRATE 50 UG/ML
50 INJECTION, SOLUTION INTRAMUSCULAR; INTRAVENOUS
Status: DISCONTINUED | OUTPATIENT
Start: 2020-06-11 | End: 2020-06-06

## 2020-06-05 RX ADMIN — IPRATROPIUM BROMIDE AND ALBUTEROL SULFATE 3 ML: .5; 3 SOLUTION RESPIRATORY (INHALATION) at 09:06

## 2020-06-05 RX ADMIN — CALCIUM CHLORIDE 500 MG: 100 INJECTION, SOLUTION INTRAVENOUS at 12:06

## 2020-06-05 RX ADMIN — ALBUMIN (HUMAN) 25 G: 12.5 SOLUTION INTRAVENOUS at 10:06

## 2020-06-05 RX ADMIN — ROCURONIUM BROMIDE 100 MG: 10 INJECTION, SOLUTION INTRAVENOUS at 07:06

## 2020-06-05 RX ADMIN — TRANEXAMIC ACID 1 MG/KG/HR: 100 INJECTION, SOLUTION INTRAVENOUS at 08:06

## 2020-06-05 RX ADMIN — FENTANYL CITRATE 100 MCG: 50 INJECTION, SOLUTION INTRAMUSCULAR; INTRAVENOUS at 07:06

## 2020-06-05 RX ADMIN — PHENYLEPHRINE HYDROCHLORIDE 100 MCG: 10 INJECTION INTRAVENOUS at 07:06

## 2020-06-05 RX ADMIN — METOPROLOL TARTRATE 25 MG: 25 TABLET, FILM COATED ORAL at 06:06

## 2020-06-05 RX ADMIN — PROPOFOL 100 MG: 10 INJECTION, EMULSION INTRAVENOUS at 07:06

## 2020-06-05 RX ADMIN — DEXTROSE, SODIUM CHLORIDE, AND POTASSIUM CHLORIDE: 5; .45; .15 INJECTION INTRAVENOUS at 03:06

## 2020-06-05 RX ADMIN — PROPOFOL 30 MG: 10 INJECTION, EMULSION INTRAVENOUS at 09:06

## 2020-06-05 RX ADMIN — PANTOPRAZOLE SODIUM 40 MG: 40 INJECTION, POWDER, LYOPHILIZED, FOR SOLUTION INTRAVENOUS at 03:06

## 2020-06-05 RX ADMIN — IPRATROPIUM BROMIDE AND ALBUTEROL SULFATE 3 ML: .5; 3 SOLUTION RESPIRATORY (INHALATION) at 03:06

## 2020-06-05 RX ADMIN — PROTAMINE SULFATE 265 MG: 10 INJECTION, SOLUTION INTRAVENOUS at 12:06

## 2020-06-05 RX ADMIN — FENTANYL CITRATE 25 MCG: 50 INJECTION INTRAMUSCULAR; INTRAVENOUS at 06:06

## 2020-06-05 RX ADMIN — PROPOFOL 40 MCG/KG/MIN: 10 INJECTION, EMULSION INTRAVENOUS at 03:06

## 2020-06-05 RX ADMIN — Medication 0.02 MCG/KG/MIN: at 02:06

## 2020-06-05 RX ADMIN — SODIUM CHLORIDE: 0.9 INJECTION, SOLUTION INTRAVENOUS at 07:06

## 2020-06-05 RX ADMIN — SODIUM CHLORIDE 1 UNITS/HR: 9 INJECTION, SOLUTION INTRAVENOUS at 03:06

## 2020-06-05 RX ADMIN — FENTANYL CITRATE 50 MCG: 50 INJECTION, SOLUTION INTRAMUSCULAR; INTRAVENOUS at 01:06

## 2020-06-05 RX ADMIN — VANCOMYCIN HYDROCHLORIDE 1500 MG: 1.5 INJECTION, POWDER, LYOPHILIZED, FOR SOLUTION INTRAVENOUS at 06:06

## 2020-06-05 RX ADMIN — EPINEPHRINE 0.02 MCG/KG/MIN: 1 INJECTION INTRAMUSCULAR; INTRAVENOUS; SUBCUTANEOUS at 03:06

## 2020-06-05 RX ADMIN — FENTANYL CITRATE 25 MCG: 50 INJECTION INTRAMUSCULAR; INTRAVENOUS at 03:06

## 2020-06-05 RX ADMIN — HEPARIN SODIUM 32000 UNITS: 1000 INJECTION, SOLUTION INTRAVENOUS; SUBCUTANEOUS at 10:06

## 2020-06-05 RX ADMIN — MIDAZOLAM HYDROCHLORIDE 1 MG: 1 INJECTION, SOLUTION INTRAMUSCULAR; INTRAVENOUS at 07:06

## 2020-06-05 RX ADMIN — KETAMINE HYDROCHLORIDE 20 MG: 100 INJECTION, SOLUTION, CONCENTRATE INTRAMUSCULAR; INTRAVENOUS at 08:06

## 2020-06-05 RX ADMIN — NICARDIPINE HYDROCHLORIDE 2.5 MG/HR: 0.2 INJECTION, SOLUTION INTRAVENOUS at 12:06

## 2020-06-05 RX ADMIN — NOREPINEPHRINE BITARTRATE 0.02 MCG/KG/MIN: 1 INJECTION, SOLUTION, CONCENTRATE INTRAVENOUS at 08:06

## 2020-06-05 RX ADMIN — ONDANSETRON 4 MG: 2 INJECTION, SOLUTION INTRAMUSCULAR; INTRAVENOUS at 01:06

## 2020-06-05 RX ADMIN — OXYCODONE HYDROCHLORIDE 10 MG: 10 TABLET ORAL at 10:06

## 2020-06-05 RX ADMIN — FENTANYL CITRATE 75 MCG: 50 INJECTION, SOLUTION INTRAMUSCULAR; INTRAVENOUS at 09:06

## 2020-06-05 RX ADMIN — MUPIROCIN 1 G: 20 OINTMENT TOPICAL at 08:06

## 2020-06-05 RX ADMIN — ASPIRIN 325 MG ORAL TABLET 325 MG: 325 PILL ORAL at 08:06

## 2020-06-05 RX ADMIN — ROCURONIUM BROMIDE 50 MG: 10 INJECTION, SOLUTION INTRAVENOUS at 09:06

## 2020-06-05 RX ADMIN — PROPOFOL 30 MG: 10 INJECTION, EMULSION INTRAVENOUS at 08:06

## 2020-06-05 RX ADMIN — FENTANYL CITRATE 75 MCG: 50 INJECTION, SOLUTION INTRAMUSCULAR; INTRAVENOUS at 12:06

## 2020-06-05 RX ADMIN — NICARDIPINE HYDROCHLORIDE 7.5 MG/HR: 0.2 INJECTION, SOLUTION INTRAVENOUS at 02:06

## 2020-06-05 RX ADMIN — SUGAMMADEX 400 MG: 100 INJECTION, SOLUTION INTRAVENOUS at 01:06

## 2020-06-05 RX ADMIN — FENTANYL CITRATE 50 MCG: 50 INJECTION, SOLUTION INTRAMUSCULAR; INTRAVENOUS at 08:06

## 2020-06-05 RX ADMIN — SODIUM CHLORIDE: 0.9 INJECTION, SOLUTION INTRAVENOUS at 06:06

## 2020-06-05 RX ADMIN — MUPIROCIN 1 G: 20 OINTMENT TOPICAL at 06:06

## 2020-06-05 RX ADMIN — PROPOFOL 30 MG: 10 INJECTION, EMULSION INTRAVENOUS at 07:06

## 2020-06-05 RX ADMIN — PROPOFOL 40 MG: 10 INJECTION, EMULSION INTRAVENOUS at 09:06

## 2020-06-05 RX ADMIN — DEXMEDETOMIDINE HYDROCHLORIDE 0.4 MCG/KG/HR: 4 INJECTION, SOLUTION INTRAVENOUS at 05:06

## 2020-06-05 RX ADMIN — ALBUMIN (HUMAN) 25 G: 12.5 SOLUTION INTRAVENOUS at 05:06

## 2020-06-05 RX ADMIN — ALBUMIN (HUMAN) 500 ML: 12.5 SOLUTION INTRAVENOUS at 03:06

## 2020-06-05 RX ADMIN — LIDOCAINE HYDROCHLORIDE 0.3 MG: 10 INJECTION, SOLUTION EPIDURAL; INFILTRATION; INTRACAUDAL; PERINEURAL at 06:06

## 2020-06-05 RX ADMIN — EPINEPHRINE 0.02 MCG/KG/MIN: 1 INJECTION INTRAMUSCULAR; INTRAVENOUS; SUBCUTANEOUS at 12:06

## 2020-06-05 RX ADMIN — VANCOMYCIN HYDROCHLORIDE 1.5 G: 1 INJECTION, POWDER, LYOPHILIZED, FOR SOLUTION INTRAVENOUS at 06:06

## 2020-06-05 RX ADMIN — LIDOCAINE HYDROCHLORIDE 100 MG: 20 INJECTION, SOLUTION INTRAVENOUS at 07:06

## 2020-06-05 RX ADMIN — ROCURONIUM BROMIDE 20 MG: 10 INJECTION, SOLUTION INTRAVENOUS at 12:06

## 2020-06-05 RX ADMIN — ALBUMIN (HUMAN) 250 ML: 12.5 SOLUTION INTRAVENOUS at 06:06

## 2020-06-05 RX ADMIN — ACETAMINOPHEN 1000 MG: 10 INJECTION, SOLUTION INTRAVENOUS at 11:06

## 2020-06-05 RX ADMIN — IPRATROPIUM BROMIDE AND ALBUTEROL SULFATE 3 ML: .5; 3 SOLUTION RESPIRATORY (INHALATION) at 11:06

## 2020-06-05 NOTE — INTERVAL H&P NOTE
The patient has been examined and the H&P has been reviewed:    I concur with the findings and no changes have occurred since H&P was written.    Anesthesia/Surgery risks, benefits and alternative options discussed and understood by patient/family.    Active Hospital Problems    Diagnosis  POA    Atherosclerosis of native coronary artery of native heart with angina pectoris [I25.119]  Yes      Resolved Hospital Problems   No resolved problems to display.

## 2020-06-05 NOTE — ANESTHESIA PROCEDURE NOTES
[facility]    Diagnosis: CAD  Patient location during procedure: done in OR  Procedure start time: 6/5/2020 8:00 AM  Timeout: 6/5/2020 8:00 AM  Procedure end time: 6/5/2020 8:20 AM    Staffing  Authorizing Provider: Steffi Fraser MD  Performing Provider: Krissy Israel MD    Anesthesiologist was present at the time of the procedure.  Preanesthetic Checklist  Completed: patient identified, site marked, surgical consent, pre-op evaluation, timeout performed, IV checked, risks and benefits discussed, monitors and equipment checked and anesthesia consent given  [place] [facility]  Skin Prep: chlorhexidine gluconate  Location: right,  internal jugular vein  Vessel Caliber: medium, patent, compressibility normal  Introducer: 7 Fr,   Device: CCO/Oximetric Catheter  Catheter Size: 9 Fr  Catheter placement by yes. Heme positive aspiration all ports. PAC floated with balloon up not wedgedSterile sheath usedInsertion Attempts: 1  Indication: intravenous therapy, hemodynamic monitoring  Ultrasound Guidance  Needle advanced into vessel with real time Ultrasound guidance.  Image recorded and saved.  Guidewire confirmed in vessel.  Sterile sheath used.  Assessment  Central Line Bundle Protocol followed. Hand hygiene before procedure, surgical cap worn, surgical mask worn, sterile surgical gloves worn, large sterile drape used.  Verification: blood return  Dressing: secured with tape and tegaderm  Patient: Tolerated Well

## 2020-06-05 NOTE — ASSESSMENT & PLAN NOTE
Jeffery Melton is a 64 y.o. male s/p CABG x 3 with pacer wires for CAD on 06/05/2020     Neuro  Sedation: propofol  Pain: fentanyl     Resp  Intubated  CXR, ABG now  Wean vent to extubate     Cards  Pressors: epi 0.02, wean as tolerated  Chest tubes to suction  Aspirin 325  Keep SBP < 140; MAPs 65    Renal  Nunez in place  Post op labs pending  Normal renal function pre-op    FEN  NPO  Replace lytes as needed     GI  protonix  Bowel regimen     Endo  Insulin gtt off  Endo on board, appreciate recs    ID  Periop vanc last given at 9    Heme  Pre-op H/H 14.3/45.6  Post op labs pending    Dispo: SICU

## 2020-06-05 NOTE — ANESTHESIA PROCEDURE NOTES
KATHRYN    Diagnosis: Coronary artery disease involving native coronary artery of native heart with unstable angina pectoris (I25.110)  Patient location during procedure: OR  Procedure start time: 6/5/2020 8:01 AM  Timeout: 6/5/2020 8:00 AM  Procedure end time: 6/5/2020 8:57 AM  Exam type: Baseline  Staffing  Anesthesiologist: Steffi Fraser MD  Performed: anesthesiologist   Preanesthetic Checklist  Completed: patient identified, surgical consent, pre-op evaluation, timeout performed, risks and benefits discussed, monitors and equipment checked, anesthesia consent given, oxygen available, suction available, hand hygiene performed and patient being monitored  Setup & Induction  Patient preparation: bite block inserted  Probe Insertion: easy  Exam: complete      Findings  Impression  Other Findings  Diagnostic intraoperative KATHRYN performed at the request of Dr. Ceron for CABG.    Baseline exam:  - LV and RV systolic function are normal with LVEF >55%  - No regional wall motion abnormalities  - RV is moderately dilated  - Trace to mild mitral regurgitation  - Trivial tricuspid regurgitation  - No aortic insufficiency. There is mild calcification of the noncoronary cusp of the aortic valve. The mean pressure gradient across the valve is 3 mmHg. There is no evidence of aortic stenosis.   - No pulmonic insufficiency  - No PFO on color flow doppler  - Moderate atherosclerotic disease in the descending thoracic aorta. Mild atherosclerotic disease in the aortic arch. No other significant aortic pathology.  - No pericardial or pleural effusions    Post-procedure exam:  - s/p 3v CPB CABG  - LV and RV systolic function are unchanged  - No new regional wall motion abnormalities  - Mitral regurgitation was mild immediately s/p CPB with vena contract of 0.29 cm  - Other valves are unchanged in structure and function  - No PFO  - No change in aortic pathology  - No new effusions  - stable s/p chest closure    Probe removed  atraumatically    Probe Removal      Exam     Left Heart  Left Atruim: normal        LVAD:no  Estimated Ejection Fraction: > 55% normal  Regional Wall Abnormalities: no RWMA            Right Heart  FINDINGS - RIGHT VENTRICLE: moderately dilated.  Right Ventricle Function: normal  Right Atria: cm and normal    Intra Atrial Septum  PFO: no shunt by color flow doppler  no IAS aneurysm        Right Ventricle  FINDINGS - RIGHT VENTRICLE: moderately dilated., Free Wall Thickness: normal    Aortic Valve:  Morphology: trileaflet  Regurgitation: trace     Mitral Valve:  Morphology:normal  Jet Description: trace    Tricuspid Valve:  Morphology: normal  Regurgitation: none    Pulmonic Valve:  Morphology:normal  Regurgitation(color flow): none          Effusions  Effusions: none    Summary    Other Findings   Diagnostic intraoperative KATHRYN performed at the request of Dr. Ceron for CABG.    Baseline exam:  - LV and RV systolic function are normal with LVEF >55%  - No regional wall motion abnormalities  - RV is moderately dilated  - Trace to mild mitral regurgitation  - Trivial tricuspid regurgitation  - No aortic insufficiency. There is mild calcification of the noncoronary cusp of the aortic valve. The mean pressure gradient across the valve is 3 mmHg. There is no evidence of aortic stenosis.   - No pulmonic insufficiency  - No PFO on color flow doppler  - Moderate atherosclerotic disease in the descending thoracic aorta. Mild atherosclerotic disease in the aortic arch. No other significant aortic pathology.  - No pericardial or pleural effusions    Post-procedure exam:  - s/p 3v CPB CABG  - LV and RV systolic function are unchanged  - No new regional wall motion abnormalities  - Mitral regurgitation was mild immediately s/p CPB with vena contract of 0.29 cm  - Other valves are unchanged in structure and function  - No PFO  - No change in aortic pathology  - No new effusions  - stable s/p chest closure    Probe removed  atraumatically

## 2020-06-05 NOTE — OP NOTE
DATE OF PROCEDURE: 06/05/2020      ATTENDING SURGEON:  Anuj Ceron M.D.    ASSISTANT:  None     PREOPERATIVE DIAGNOSES:  1. Coronary artery disease.  2. Asthma  3. Hypertension.  4. Hyperlipidemia.  5. Previous stroke  6. Permanent colostomy     POSTOPERATIVE DIAGNOSES:  1. Coronary artery disease.  2. Asthma  3. Hypertension.  4. Hyperlipidemia.  5. Previous stroke  6. Permanent colostomy    OPERATION PERFORMED:  Coronary artery bypass grafting x3, with left internal   mammary artery to left anterior descending, saphenous vein graft to the diagonal, and saphenous vein graft to the posterior descending artery.     ANESTHESIA:  General endotracheal.     ESTIMATED BLOOD LOSS:  100 mL.     BRIEF HISTORY:  Mr. Melton is a 64 year old man who initially presented with dyspnea on exertion and chest heaviness.  He had an angiogram in November of 2019 which demonstrated multivessel disease.  He was told at that time that percutaneous intervention was not an option.  He sought a 2nd opinion here at [facility], and surgical revascularization was recommended.  He now presents for coronary artery bypass grafting.     PROCEDURE IN DETAIL:  After obtaining informed and written consent, the patient   was brought to the Operating Room and placed on the operating room table in   supine position.  After induction of adequate general endotracheal anesthesia,   the patient's chest, abdomen, pelvis and bilateral lower extremities were   prepped and draped in the usual sterile fashion.  Skin incisions were made over   the chest and left lower extremity.  Through the left lower extremity skin   incision, greater saphenous vein was harvested using endoscopic technique.  Once   the vein was out, the leg was closed in multiple layers of absorbable suture   material.  Through the chest incision, a median sternotomy was performed.  The   left internal mammary artery was completely dissected, but not divided.  A   pericardial well was  created.  The patient was systemically heparinized.    Cannulation sutures were placed in the aorta and in the right atrial appendage.    The aortic cannula was inserted, followed by the venous.  Antegrade and   retrograde cardioplegia catheters were placed.  The mammary was divided   distally, and its end was prepared.  The patient was then put on cardiopulmonary   bypass.  Once on bypass, the aortic crossclamp was applied and the heart was   arrested using cold blood enhanced antegrade cardioplegia.  A prompt   electromechanical arrest was achieved.  Once the cardioplegia was all in, we   first addressed the posterior descending artery.  A site suitable for grafting   was located, and an arteriotomy was made.  The vein was brought up, its end was   spatulated, and the anastomosis was performed using a running 7-0 Prolene   suture.  After completion of the anastomosis, it was tested.  It was hemostatic.    Another dose of cardioplegia was given retrograde and down this vein.  We then   turned our attention to the lateral wall.  The diagonal was identified and an arteriotomy was made.  The vein was brought up, its end was   spatulated, and the anastomosis was performed using a running 7-0 Prolene   suture.  After completion of the anastomosis, it was tested.  It was hemostatic.    Another dose of cardioplegia was given retrograde and down this vein.  We then   turned our attention to the anterior wall.  A site suitable for grafting in the   distal third of the LAD was identified, and an arteriotomy was made.  The   mammary was brought up, and the anastomosis was performed using a running 7-0   Prolene suture.  After completion of the anastomosis, it was tested.  It was   hemostatic.  Another dose of cardioplegia was given retrograde and down the   lateral wall vein.  The mammary pedicle was tacked to the heart using two 5-0   Prolene sutures.  We then prepared to do the two venous proximals.  Both were   done in a  similar fashion, using a #11 blade to incise the aorta and an aortic   punch to enlarge the aortotomy.  The veins were checked for rotation, and cut to   an appropriate length.  Their ends were spatulated, and the proximal   anastomoses were performed using a running 5-0 Prolene suture.  After completion   of the proximals, the hot shot was given retrograde.  Root de-airing maneuvers   were performed, and the aortic cross-clamp was removed.  The patient was   subsequently weaned from cardiopulmonary bypass.  The patient did separate   easily from bypass.  Once off bypass, all surgical sites were inspected.  There   was good hemostasis.  The test dose of protamine was administered, and this was   well tolerated.  The total dose was then given.  Crawley through the total dose,   all cannulas were removed.  All surgical sites were again inspected, again   there was good hemostasis.  Ventricular pacing wires were placed.  Drains   were placed.  After again   confirming adequate hemostasis, the patient's chest was closed using #6   stainless steel wires to reapproximate the sternum.  The overlying soft tissues   were reapproximated using absorbable suture material.  The patient's chest was   washed and dried, and a dry dressing was applied.  The patient tolerated the   procedure well, there were no complications.  At the conclusion of the case,   sponge and instrument counts were correct. There was no qualified resident available to assist in this operation.

## 2020-06-05 NOTE — SUBJECTIVE & OBJECTIVE
Follow-up For: Procedure(s) (LRB):  CORONARY ARTERY BYPASS GRAFT (CABG) x 3  (N/A)    Post-Operative Day: Day of Surgery     Past Medical History:   Diagnosis Date    Asthma     CAD (coronary artery disease)     Hyperlipidemia     Hypertension        History reviewed. No pertinent surgical history.    Review of patient's allergies indicates:   Allergen Reactions    [place] Shortness Of Breath and Swelling    Penicillins     Chocolate flavor Diarrhea and Nausea And Vomiting       Family History     Problem Relation (Age of Onset)    Hypertension Mother, Father        Tobacco Use    Smoking status: Never Smoker    Smokeless tobacco: Never Used   Substance and Sexual Activity    Alcohol use: Not Currently    Drug use: Never    Sexual activity: Not on file      Review of Systems   Unable to perform ROS: Intubated     Objective:     Vital Signs (Most Recent):  Temp: 98.2 °F (36.8 °C) (06/05/20 1415)  Pulse: 82 (06/05/20 1420)  Resp: 18 (06/05/20 1420)  BP: (!) 96/48 (06/05/20 1415)  SpO2: 96 % (06/05/20 1420) Vital Signs (24h Range):  Temp:  [97.4 °F (36.3 °C)-98.2 °F (36.8 °C)] 98.2 °F (36.8 °C)  Pulse:  [65-82] 82  Resp:  [18-24] 18  SpO2:  [95 %-98 %] 96 %  BP: ()/(48-88) 96/48     Weight: 103 kg (227 lb 1.2 oz)  Body mass index is 31.67 kg/m².      Intake/Output Summary (Last 24 hours) at 6/5/2020 1428  Last data filed at 6/5/2020 1415  Gross per 24 hour   Intake 847 ml   Output 970 ml   Net -123 ml       Physical Exam   Constitutional: He appears well-developed and well-nourished.   HENT:   Head: Normocephalic and atraumatic.   Eyes: Right eye exhibits no discharge. Left eye exhibits no discharge. No scleral icterus.   Neck: No JVD present.   RIJ in place   Cardiovascular: Normal rate and regular rhythm.   Pulmonary/Chest:   Intubated. Sternotomy with dressing in place. Incision c/di.  Chest tubes x 3 in place connected to atrium. Pacer wires in place.   Abdominal: Soft. He exhibits no distension.    Colostomy in place in LLQ   Genitourinary:   Genitourinary Comments: Nunez in place   Musculoskeletal:   Left leg with ace bandage in place   Neurological:   sedated   Skin: Skin is warm and dry.       Vents:  Ventilator Initiated: Yes (06/05/20 1420)  Oxygen Concentration (%): 100 (06/05/20 1420)    Lines/Drains/Airways     Central Venous Catheter Line            Pulmonary Artery Catheter Assessment  06/05/20 0800 less than 1 day          Drain                 Colostomy LLQ -- days         Urethral Catheter 06/05/20 0726 Straight-tip;Temperature probe less than 1 day         Y Chest Tube 1 and 2 06/05/20 1300 Right Pleural 19 Fr. Mediastinal 19 Fr. less than 1 day         Y Chest Tube 3 and 4 06/05/20 1300 Left Pleural 19 Fr. less than 1 day          Airway                 Airway - Non-Surgical 06/05/20 0920 less than 1 day          Arterial Line                 Arterial Line 06/05/20 0710 Left Radial less than 1 day          Peripheral Intravenous Line                 Peripheral IV - Single Lumen 06/05/20 0545 20 G Left Hand less than 1 day                Significant Labs:    CBC/Anemia Profile:  Recent Labs   Lab 06/04/20  1043 06/05/20  1057 06/05/20  1154 06/05/20  1222   WBC 5.40  --   --   --    HGB 14.3  --   --   --    HCT 45.6 32* 29* 30*     --   --   --    MCV 89  --   --   --    RDW 13.7  --   --   --         Chemistries:  Recent Labs   Lab 06/04/20  1043      K 4.2      CO2 28   BUN 20   CREATININE 0.9   CALCIUM 9.3   ALBUMIN 3.8   PROT 7.6   BILITOT 0.7   ALKPHOS 105   ALT 27   AST 22       CMP:   Recent Labs   Lab 06/04/20  1043      K 4.2      CO2 28      BUN 20   CREATININE 0.9   CALCIUM 9.3   PROT 7.6   ALBUMIN 3.8   BILITOT 0.7   ALKPHOS 105   AST 22   ALT 27   ANIONGAP 6*   EGFRNONAA >60.0     Coagulation:   Recent Labs   Lab 06/04/20  1043   INR 1.0   APTT 31.7     Lactic Acid: No results for input(s): LACTATE in the last 48 hours.  All pertinent labs  within the past 24 hours have been reviewed.    Significant Imaging: I have reviewed and interpreted all pertinent imaging results/findings within the past 24 hours.     Post op CXR pending

## 2020-06-05 NOTE — HPI
Reason for Consult: Management of Hyperglycemia     Surgical Procedure and Date: CABG x2 6/5/20    Lab Results   Component Value Date    HGBA1C 5.8 (H) 06/04/2020     HPI:   Patient is a 64 y.o. male with a diagnosis of asthma, CAD, HLD, and HTN who presents to [facility] via virtual visit for possible CABG. Patient had a [facility] cath perfomred in November of 2019 and was was told that he had lesion that were not stentable.  Patient had a [facility] cath perfomred in November of 2019 and was was told that he had lesion that were not stentable. He was admitted to SICU s/p CABG by  Dr. Ceron on 6/5/20. Endocrinology consulted for management of hyperglycemia.

## 2020-06-05 NOTE — HPI
"Jeffery Melton is a 64 y.o. male with a medical history of asthma, CAD, HLD, and HTN who presents to [facility] via virtual visit for possible CABG. Patient had a [facility] cath perfomred in November of 2019 and was was told that he had lesion that were not stentable. Patient lives in [place] and was evaluated by CTS there however he would prefer to be seen in [place] since it is closer to his daughter.  He also states he would like a second option. Patient reports that he does have intermittent shortness and chest heaviness with physical activity.  He is able to ambulate without difficulty, appears to have good functional status.  He has a history of a "mild" stroke in 2003 but denies any deficits.  Patient does not drink ETOH or use tobacco.Jeffery Melton is a 64 y.o. male with a medical history of asthma, CAD, HLD, and HTN who presents to [facility] via virtual visit for possible CABG. Patient had a [facility] cath perfomred in November of 2019 and was was told that he had lesion that were not stentable. Patient lives in [place] and was evaluated by CTS there however he would prefer to be seen in [place] since it is closer to his daughter.  He also states he would like a second option. Patient reports that he does have intermittent shortness and chest heaviness with physical activity.  He is able to ambulate without difficulty, appears to have good functional status.  He has a history of a "mild" stroke in 2003 but denies any deficits.  Patient does not drink ETOH or use tobacco.    Mr. Melton underwent CABG x3 with Dr. Ceron on 6/5/2020. He was admitted to the SICU intubated and sedated. [place] on epi at 0.02 and paced at 80 although pacer is not capturing every beat. Vent settings AC/VC FiO2 100%, PEEP 5  Received cell saver 547 cc, but no other blood products    "

## 2020-06-05 NOTE — ANESTHESIA POSTPROCEDURE EVALUATION
Anesthesia Post Evaluation    Patient: Jeffery Melton    Procedure(s) Performed: Procedure(s) (LRB):  CORONARY ARTERY BYPASS GRAFT (CABG) x 3  (N/A)    Final Anesthesia Type: general    Patient location during evaluation: ICU  Patient participation: No - Unable to Participate, Sedation  Level of consciousness: sedated  Post-procedure vital signs: reviewed and stable  Pain management: adequate  Airway patency: patent    PONV status at discharge: No PONV  Anesthetic complications: no      Cardiovascular status: hemodynamically stable  Respiratory status: intubated and ventilator            Vitals Value Taken Time   /57 6/5/2020  2:30 PM   Temp 36.8 °C (98.2 °F) 6/5/2020  2:15 PM   Pulse 73 6/5/2020  2:56 PM   Resp 33 6/5/2020  2:56 PM   SpO2 91 % 6/5/2020  2:56 PM   Vitals shown include unvalidated device data.      No case tracking events are documented in the log.      Pain/Alize Score: No data recorded

## 2020-06-05 NOTE — PROGRESS NOTES
Patient assigned to this writer by charge nurse. The patient was  escorted to [facility] room 15 by Lien MAGAÑA. The patient is currently  changing into a hospital gown. This writer is completing a chart review and reviewing MD orders.

## 2020-06-05 NOTE — BRIEF OP NOTE
[facility]  Cardiothoracic Surgery  Operative Note    SUMMARY     Date of Procedure: 6/5/2020     Procedure: Procedure(s) (LRB):  CORONARY ARTERY BYPASS GRAFT (CABG) x 3  With LIMA-LAD, SVG-D1, and SVG-PDA  87131, [id number], [id number]    Surgeon(s) and Role:     * Anuj Ceron MD - Primary    Assisting Surgeon: None    Pre-Operative Diagnosis: Coronary artery disease involving native coronary artery of native heart with unstable angina pectoris [I25.110]    Post-Operative Diagnosis: Post-Op Diagnosis Codes:     * Coronary artery disease involving native coronary artery of native heart with unstable angina pectoris [I25.110]    Anesthesia: General        Description of the Findings of the Procedure: LAD small but patent        Complications: None    Estimated Blood Loss (EBL): 100 mL             Specimens:   Specimen (12h ago, onward)    None                  Attestation:  I was present and scrubbed for the procedure. There was no qualified resident available to assist in this operation.

## 2020-06-05 NOTE — CONSULTS
[facility]  Endocrinology  Diabetes Consult Note    Consult Requested by: Anuj Ceron MD   Reason for admit: S/P CABG (coronary artery bypass graft)    HISTORY OF PRESENT ILLNESS:  Reason for Consult: Management of Hyperglycemia     Surgical Procedure and Date: CABG x2 6/5/20    Lab Results   Component Value Date    HGBA1C 5.8 (H) 06/04/2020     HPI:   Patient is a 64 y.o. male with a diagnosis of asthma, CAD, HLD, and HTN who presents to [facility] via virtual visit for possible CABG. Patient had a [facility] cath perfomred in November of 2019 and was was told that he had lesion that were not stentable.  Patient had a [facility] cath perfomred in November of 2019 and was was told that he had lesion that were not stentable. He was admitted to SICU s/p CABG by  Dr. Ceron on 6/5/20. Endocrinology consulted for management of hyperglycemia.    Interval HPI:   Overnight events: Transported from OR to SICU intubated s/p CABG. BG reasonably controlled at time of consult. IV insulin infusion initiated at 1 u/hr.   Eating:   NPO  Nausea: No  Hypoglycemia and intervention: No  Fever: No  TPN and/or TF: No  If yes, type of TF/TPN and rate: none    PMH, PSH, FH, SH reviewed     ROS:  Unable to obtain due to: Sedation,Intubation,Altered mental status,Critical illness,Reviewed ROS from note dated 6/5/20 by Kailey Greer MD    Review of Systems    Current Medications and/or Treatments Impacting Glycemic Control  Immunotherapy:    Immunosuppressants     None        Steroids:   Hormones (From admission, onward)    None        Pressors:    Autonomic Drugs (From admission, onward)    Start     Stop Route Frequency Ordered    06/05/20 1545  norepinephrine 4 mg in dextrose 5% 250 mL infusion (premix) (titrating)     Question Answer Comment   Titrate by: (in mcg/kg/min) 0.02    Titrate interval: (in minutes) 15    Titrate to maintain: (MAP or SBP) MAP    Greater than: (in mmHg) 60    Maximum dose: (in mcg/kg/min) 3         -- IV Continuous 06/05/20 1440    06/05/20 1445  EPINEPHrine (ADRENALIN) 5 mg in sodium chloride 0.9% 250 mL infusion     Question Answer Comment   Titrate by: (in mcg/kg/min) 0.02    Titrate interval: (in minutes) 5    Titrate to maintain: (SBP or MAP or Cardiac Index) MAP    Greater than: (in mmHg) 65    Cardiac index greater than: (in L/min) 2.2    Maximum dose: (in mcg/kg/min) 2        -- IV Continuous 06/05/20 1415        Hyperglycemia/Diabetes Medications:   Antihyperglycemics (From admission, onward)    Start     Stop Route Frequency Ordered    06/05/20 1445  insulin regular 100 Units in sodium chloride 0.9% 100 mL infusion     Question:  Insulin rate changes (DO NOT MODIFY ANSWER)  Answer:  [email or web address]    -- IV Continuous 06/05/20 1415             PHYSICAL EXAMINATION:  Vitals:    06/05/20 1424   BP:    Pulse: 84   Resp:    Temp:      Body mass index is 31.67 kg/m².    Physical Exam   Constitutional: Well developed, obese, NAD.  ENT: External ears no masses with nose patent  Neck: Supple; trachea midline  Cardiovascular: Normal heart sounds, no LE edema. DP +2 bilaterally.  Lungs: Normal effort; lungs anterior bilaterally clear to auscultation.  Intubated on a ventilator.  Abdomen: Soft, no masses, no hernias.  Hypoactive BS noted.  MS: No clubbing or cyanosis of nails noted; unable to assess gait.  Skin: No rashes, lesions, or ulcers; no nodules. Mid-sternal incision with telfa island dressing, CDI.  CT x 2.  LLE wrapped with ACE wrap.  Psychiatric: ELIZABETH  Neurological: ELIZABETH  Foot: Nails in good condition, no amputations noted      Labs Reviewed and Include   No results for input(s): GLU, CALCIUM, ALBUMIN, PROT, NA, K, CO2, CL, BUN, CREATININE, ALKPHOS, ALT, AST, BILITOT in the last 24 hours.  Lab Results   Component Value Date    WBC 17.59 (H) 06/05/2020    HGB 11.9 (L) 06/05/2020    HCT 34 (L) 06/05/2020    MCV 88 06/05/2020      06/05/2020     No results for input(s): TSH, FREET4 in the last 168 hours.  Lab Results   Component Value Date    HGBA1C 5.8 (H) 06/04/2020       Nutritional status:   Body mass index is 31.67 kg/m².  Lab Results   Component Value Date    ALBUMIN 3.8 06/04/2020     No results found for: PREALBUMIN    Estimated Creatinine Clearance: 102.7 mL/min (based on SCr of 0.9 mg/dL).    Accu-Checks  Recent Labs     06/05/20  1417 06/05/20  1514   POCTGLUCOSE 151* 179*        ASSESSMENT and PLAN    * S/P CABG (coronary artery bypass graft)  Managed per primary team  S/p CABG 6/5  Optimize BG control    Hyperglycemia  BG goal 110-140    Continue IV insulin infusion protocol  Requires intensive BG monitoring while on protocol     ** Please call Endocrine for any BG related issues **  ** Please notify Endocrine for any change and/or advance in diet**    Discharge planning: TBD        Atherosclerosis of native coronary artery of native heart with angina pectoris  S/p CABG       Class 1 obesity due to excess calories with serious comorbidity and body mass index (BMI) of 31.0 to 31.9 in adult  Body mass index is 31.67 kg/m².  May increase insulin resistance.             Casie Nieto NP  Endocrinology  [facility]

## 2020-06-05 NOTE — PROGRESS NOTES
Blood extension form sent to blood bank via the tubing system. Cross and screen was completed yesterday.

## 2020-06-05 NOTE — H&P
"[facility]  Critical Care - Surgery  History & Physical    Patient Name: Jeffery Melton  MRN: [id number]  Admission Date: 6/5/2020  Code Status: Full Code  Attending Physician: Anuj Ceron MD   Primary Care Provider: Primary Doctor No   Principal Problem: <principal problem not specified>    Subjective:     HPI:  Jeffery Melton is a 64 y.o. male with a medical history of asthma, CAD, HLD, and HTN who presents to [facility] via virtual visit for possible CABG. Patient had a [facility] cath perfomred in November of 2019 and was was told that he had lesion that were not stentable. Patient lives in [place] and was evaluated by CTS there however he would prefer to be seen in [place] since it is closer to his daughter.  He also states he would like a second option. Patient reports that he does have intermittent shortness and chest heaviness with physical activity.  He is able to ambulate without difficulty, appears to have good functional status.  He has a history of a "mild" stroke in 2003 but denies any deficits.  Patient does not drink ETOH or use tobacco.Jeffery Melton is a 64 y.o. male with a medical history of asthma, CAD, HLD, and HTN who presents to [facility] via virtual visit for possible CABG. Patient had a [facility] cath perfomred in November of 2019 and was was told that he had lesion that were not stentable. Patient lives in [place] and was evaluated by CTS there however he would prefer to be seen in [place] since it is closer to his daughter.  He also states he would like a second option. Patient reports that he does have intermittent shortness and chest heaviness with physical activity.  He is able to ambulate without difficulty, appears to have good functional status.  He has a history of a "mild" stroke in 2003 but denies any deficits.  Patient does not drink ETOH or use tobacco.    Mr. Melton underwent CABG x3 with Dr. Ceron on 6/5/2020. He was admitted to the SICU intubated and sedated. " [place] on epi at 0.02 and paced at 80 although pacer is not capturing every beat. Vent settings AC/VC FiO2 100%, PEEP 5  Received cell saver 547 cc, but no other blood products      Hospital/ICU Course:  No notes on file    Follow-up For: Procedure(s) (LRB):  CORONARY ARTERY BYPASS GRAFT (CABG) x 3  (N/A)    Post-Operative Day: Day of Surgery     Past Medical History:   Diagnosis Date    Asthma     CAD (coronary artery disease)     Hyperlipidemia     Hypertension        History reviewed. No pertinent surgical history.    Review of patient's allergies indicates:   Allergen Reactions    [place] Shortness Of Breath and Swelling    Penicillins     Chocolate flavor Diarrhea and Nausea And Vomiting       Family History     Problem Relation (Age of Onset)    Hypertension Mother, Father        Tobacco Use    Smoking status: Never Smoker    Smokeless tobacco: Never Used   Substance and Sexual Activity    Alcohol use: Not Currently    Drug use: Never    Sexual activity: Not on file      Review of Systems   Unable to perform ROS: Intubated     Objective:     Vital Signs (Most Recent):  Temp: 98.2 °F (36.8 °C) (06/05/20 1415)  Pulse: 82 (06/05/20 1420)  Resp: 18 (06/05/20 1420)  BP: (!) 96/48 (06/05/20 1415)  SpO2: 96 % (06/05/20 1420) Vital Signs (24h Range):  Temp:  [97.4 °F (36.3 °C)-98.2 °F (36.8 °C)] 98.2 °F (36.8 °C)  Pulse:  [65-82] 82  Resp:  [18-24] 18  SpO2:  [95 %-98 %] 96 %  BP: ()/(48-88) 96/48     Weight: 103 kg (227 lb 1.2 oz)  Body mass index is 31.67 kg/m².      Intake/Output Summary (Last 24 hours) at 6/5/2020 1428  Last data filed at 6/5/2020 1415  Gross per 24 hour   Intake 847 ml   Output 970 ml   Net -123 ml       Physical Exam   Constitutional: He appears well-developed and well-nourished.   HENT:   Head: Normocephalic and atraumatic.   Eyes: Right eye exhibits no discharge. Left eye exhibits no discharge. No scleral icterus.   Neck: No JVD present.   RIJ in place   Cardiovascular:  Normal rate and regular rhythm.   Pulmonary/Chest:   Intubated. Sternotomy with dressing in place. Incision c/di.  Chest tubes x 3 in place connected to atrium. Pacer wires in place.   Abdominal: Soft. He exhibits no distension.   Colostomy in place in LLQ   Genitourinary:   Genitourinary Comments: Nunez in place   Musculoskeletal:   Left leg with ace bandage in place   Neurological:   sedated   Skin: Skin is warm and dry.       Vents:  Ventilator Initiated: Yes (06/05/20 1420)  Oxygen Concentration (%): 100 (06/05/20 1420)    Lines/Drains/Airways     Central Venous Catheter Line            Pulmonary Artery Catheter Assessment  06/05/20 0800 less than 1 day          Drain                 Colostomy LLQ -- days         Urethral Catheter 06/05/20 0726 Straight-tip;Temperature probe less than 1 day         Y Chest Tube 1 and 2 06/05/20 1300 Right Pleural 19 Fr. Mediastinal 19 Fr. less than 1 day         Y Chest Tube 3 and 4 06/05/20 1300 Left Pleural 19 Fr. less than 1 day          Airway                 Airway - Non-Surgical 06/05/20 0920 less than 1 day          Arterial Line                 Arterial Line 06/05/20 0710 Left Radial less than 1 day          Peripheral Intravenous Line                 Peripheral IV - Single Lumen 06/05/20 0545 20 G Left Hand less than 1 day                Significant Labs:    CBC/Anemia Profile:  Recent Labs   Lab 06/04/20  1043 06/05/20  1057 06/05/20  1154 06/05/20  1222   WBC 5.40  --   --   --    HGB 14.3  --   --   --    HCT 45.6 32* 29* 30*     --   --   --    MCV 89  --   --   --    RDW 13.7  --   --   --         Chemistries:  Recent Labs   Lab 06/04/20  1043      K 4.2      CO2 28   BUN 20   CREATININE 0.9   CALCIUM 9.3   ALBUMIN 3.8   PROT 7.6   BILITOT 0.7   ALKPHOS 105   ALT 27   AST 22       CMP:   Recent Labs   Lab 06/04/20  1043      K 4.2      CO2 28      BUN 20   CREATININE 0.9   CALCIUM 9.3   PROT 7.6   ALBUMIN 3.8   BILITOT 0.7    ALKPHOS 105   AST 22   ALT 27   ANIONGAP 6*   EGFRNONAA >60.0     Coagulation:   Recent Labs   Lab 06/04/20  1043   INR 1.0   APTT 31.7     Lactic Acid: No results for input(s): LACTATE in the last 48 hours.  All pertinent labs within the past 24 hours have been reviewed.    Significant Imaging: I have reviewed and interpreted all pertinent imaging results/findings within the past 24 hours.     Post op CXR pending      Assessment/Plan:     Atherosclerosis of native coronary artery of native heart with angina pectoris  Jeffery Melton is a 64 y.o. male s/p CABG x 3 with pacer wires for CAD on 06/05/2020     Neuro  Sedation: propofol  Pain: fentanyl     Resp  Intubated  CXR, ABG now  Wean vent to extubate     Cards  Pressors: epi 0.02, wean as tolerated  Chest tubes to suction  Aspirin 325  Keep SBP < 140; MAPs 65    Renal  Nunez in place  Post op labs pending  Normal renal function pre-op    FEN  NPO  Replace lytes as needed     GI  protonix  Bowel regimen     Endo  Insulin gtt off  Endo on board, appreciate recs    ID  Periop vanc last given at 9    Heme  Pre-op H/H 14.3/45.6  Post op labs pending    Dispo: SICU           Critical secondary to Patient has a condition that poses threat to life and bodily function: CAD s/p CABG x3     Critical care was time spent personally by me on the following activities: development of treatment plan with patient or surrogate and bedside caregivers, discussions with consultants, evaluation of patient's response to treatment, examination of patient, ordering and performing treatments and interventions, ordering and review of laboratory studies, ordering and review of radiographic studies, pulse oximetry, re-evaluation of patient's condition.  This critical care time did not overlap with that of any other provider or involve time for any procedures.     Yana Koch MD  Critical Care - Surgery  [facility]

## 2020-06-05 NOTE — ANESTHESIA PROCEDURE NOTES
Peripheral Block    Patient location during procedure: OR   Block not for primary anesthetic.  Reason for block: at surgeon's request and post-op pain management   Post-op Pain Location: bilateral sternum pain  Start time: 6/5/2020 8:30 AM  Timeout: 6/5/2020 8:30 AM   End time: 6/5/2020 8:40 AM    Staffing  Authorizing Provider: Steffi Fraser MD  Performing Provider: Krissy Israel MD    Preanesthetic Checklist  Completed: patient identified, site marked, surgical consent, pre-op evaluation, timeout performed, IV checked, risks and benefits discussed and monitors and equipment checked  Peripheral Block  Patient position: supine  Prep: ChloraPrep  Patient monitoring: heart rate, cardiac monitor, continuous pulse ox, continuous capnometry and frequent blood pressure checks  Block type: Transversus thoracis  Laterality: bilateral  Injection technique: single shot  Needle  Needle type: [place]   Needle gauge: 21 G  Needle length: 2 in  Needle localization: ultrasound guidance     Assessment  Injection assessment: negative aspiration

## 2020-06-05 NOTE — ASSESSMENT & PLAN NOTE
BG goal 110-140    Continue IV insulin infusion protocol  Requires intensive BG monitoring while on protocol     ** Please call Endocrine for any BG related issues **  ** Please notify Endocrine for any change and/or advance in diet**    Discharge planning: KAY

## 2020-06-05 NOTE — SUBJECTIVE & OBJECTIVE
Interval HPI:   Overnight events: Transported from OR to SICU intubated s/p CABG. BG reasonably controlled at time of consult. IV insulin infusion initiated at 1 u/hr.   Eating:   NPO  Nausea: No  Hypoglycemia and intervention: No  Fever: No  TPN and/or TF: No  If yes, type of TF/TPN and rate: none    PMH, PSH, FH, SH reviewed     ROS:  Unable to obtain due to: Sedation,Intubation,Altered mental status,Critical illness,Reviewed ROS from note dated 6/5/20 by Kailey Greer MD    Review of Systems    Current Medications and/or Treatments Impacting Glycemic Control  Immunotherapy:    Immunosuppressants     None        Steroids:   Hormones (From admission, onward)    None        Pressors:    Autonomic Drugs (From admission, onward)    Start     Stop Route Frequency Ordered    06/05/20 1545  norepinephrine 4 mg in dextrose 5% 250 mL infusion (premix) (titrating)     Question Answer Comment   Titrate by: (in mcg/kg/min) 0.02    Titrate interval: (in minutes) 15    Titrate to maintain: (MAP or SBP) MAP    Greater than: (in mmHg) 60    Maximum dose: (in mcg/kg/min) 3        -- IV Continuous 06/05/20 1440    06/05/20 1445  EPINEPHrine (ADRENALIN) 5 mg in sodium chloride 0.9% 250 mL infusion     Question Answer Comment   Titrate by: (in mcg/kg/min) 0.02    Titrate interval: (in minutes) 5    Titrate to maintain: (SBP or MAP or Cardiac Index) MAP    Greater than: (in mmHg) 65    Cardiac index greater than: (in L/min) 2.2    Maximum dose: (in mcg/kg/min) 2        -- IV Continuous 06/05/20 1415        Hyperglycemia/Diabetes Medications:   Antihyperglycemics (From admission, onward)    Start     Stop Route Frequency Ordered    06/05/20 1445  insulin regular 100 Units in sodium chloride 0.9% 100 mL infusion     Question:  Insulin rate changes (DO NOT MODIFY ANSWER)  Answer:  [email or web address]\[email or web address]\CTS INSULIN [id number].pdf    -- IV Continuous 06/05/20 1415             PHYSICAL EXAMINATION:  Vitals:     06/05/20 1424   BP:    Pulse: 84   Resp:    Temp:      Body mass index is 31.67 kg/m².    Physical Exam   PE deferred to decrease risk/exposure of COVID 19.

## 2020-06-05 NOTE — RESPIRATORY THERAPY
Received pt from OR intubated. See vent sheet for settings. ABG and EKG complete. Will continue to monitor.

## 2020-06-05 NOTE — ANESTHESIA PROCEDURE NOTES
Airway Management  Performed by: Krissy Israel MD  Authorized by: Steffi Fraser MD     Intubation:     Induction:  Intravenous    Intubated:  Postinduction    Mask Ventilation:  Easy with oral airway    Attempts:  1    Attempted By:  Student    Method of Intubation:  Direct    Blade:  Jason 2    Laryngeal View Grade: Grade IIA - cords partially seen      Difficult Airway Encountered?: No      Complications:  None    Airway Device:  Oral endotracheal tube    Airway Device Size:  7.5    Style/Cuff Inflation:  Cuffed    Inflation Amount (mL):  7    Tube secured:  22    Secured at:  The lips    Placement Verified By:  Capnometry    Complicating Factors:  None    Findings Post-Intubation:  BS equal bilateral

## 2020-06-05 NOTE — ANESTHESIA PROCEDURE NOTES
Arterial    Diagnosis: CAD    Patient location during procedure: done in OR  Procedure start time: 6/5/2020 7:10 AM  Timeout: 6/5/2020 7:10 AM  Procedure end time: 6/5/2020 7:15 AM    Staffing  Authorizing Provider: Steffi Fraser MD  Performing Provider: Krissy Israel MD    Anesthesiologist was present at the time of the procedure.    Preanesthetic Checklist  Completed: patient identified, site marked, surgical consent, IV checked, risks and benefits discussed and anesthesia consent givenArterial  Skin Prep: chlorhexidine gluconate  Local Infiltration: lidocaine  Orientation: left  Location: radial  Catheter Size: 20 G  Catheter placement by Anatomical landmarks. Heme positive aspiration all ports.Insertion Attempts: 1  Assessment  Dressing: secured with tape and tegaderm

## 2020-06-06 LAB
ALLENS TEST: ABNORMAL
ANION GAP SERPL CALC-SCNC: 8 MMOL/L (ref 8–16)
APTT BLDCRRT: 26.7 SEC (ref 21–32)
BASOPHILS # BLD AUTO: 0.01 K/UL (ref 0–0.2)
BASOPHILS NFR BLD: 0.1 % (ref 0–1.9)
BUN SERPL-MCNC: 23 MG/DL (ref 8–23)
CALCIUM SERPL-MCNC: 8.2 MG/DL (ref 8.7–10.5)
CHLORIDE SERPL-SCNC: 109 MMOL/L (ref 95–110)
CO2 SERPL-SCNC: 22 MMOL/L (ref 23–29)
CREAT SERPL-MCNC: 0.8 MG/DL (ref 0.5–1.4)
DIFFERENTIAL METHOD: ABNORMAL
EOSINOPHIL # BLD AUTO: 0 K/UL (ref 0–0.5)
EOSINOPHIL NFR BLD: 0 % (ref 0–8)
ERYTHROCYTE [DISTWIDTH] IN BLOOD BY AUTOMATED COUNT: 14.4 % (ref 11.5–14.5)
EST. GFR  (AFRICAN AMERICAN): >60 ML/MIN/1.73 M^2
EST. GFR  (NON AFRICAN AMERICAN): >60 ML/MIN/1.73 M^2
GLUCOSE SERPL-MCNC: 156 MG/DL (ref 70–110)
HCO3 UR-SCNC: 22.2 MMOL/L (ref 24–28)
HCO3 UR-SCNC: 23 MMOL/L (ref 24–28)
HCT VFR BLD AUTO: 30.2 % (ref 40–54)
HCT VFR BLD CALC: 25 %PCV (ref 36–54)
HCT VFR BLD CALC: 26 %PCV (ref 36–54)
HGB BLD-MCNC: 9.1 G/DL (ref 14–18)
IMM GRANULOCYTES # BLD AUTO: 0.01 K/UL (ref 0–0.04)
IMM GRANULOCYTES NFR BLD AUTO: 0.1 % (ref 0–0.5)
INR PPP: 1.1 (ref 0.8–1.2)
LDH SERPL L TO P-CCNC: 1.43 MMOL/L (ref 0.36–1.25)
LDH SERPL L TO P-CCNC: 2.46 MMOL/L (ref 0.36–1.25)
LYMPHOCYTES # BLD AUTO: 0.3 K/UL (ref 1–4.8)
LYMPHOCYTES NFR BLD: 3.3 % (ref 18–48)
MAGNESIUM SERPL-MCNC: 2.1 MG/DL (ref 1.6–2.6)
MCH RBC QN AUTO: 27.7 PG (ref 27–31)
MCHC RBC AUTO-ENTMCNC: 30.1 G/DL (ref 32–36)
MCV RBC AUTO: 92 FL (ref 82–98)
MONOCYTES # BLD AUTO: 0.8 K/UL (ref 0.3–1)
MONOCYTES NFR BLD: 10.8 % (ref 4–15)
NEUTROPHILS # BLD AUTO: 6.4 K/UL (ref 1.8–7.7)
NEUTROPHILS NFR BLD: 85.7 % (ref 38–73)
NRBC BLD-RTO: 0 /100 WBC
PCO2 BLDA: 35.6 MMHG (ref 35–45)
PCO2 BLDA: 38.5 MMHG (ref 35–45)
PH SMN: 7.37 [PH] (ref 7.35–7.45)
PH SMN: 7.42 [PH] (ref 7.35–7.45)
PHOSPHATE SERPL-MCNC: 2.7 MG/DL (ref 2.7–4.5)
PLATELET # BLD AUTO: 183 K/UL (ref 150–350)
PLATELET BLD QL SMEAR: ABNORMAL
PMV BLD AUTO: 9.5 FL (ref 9.2–12.9)
PO2 BLDA: 67 MMHG (ref 80–100)
PO2 BLDA: 69 MMHG (ref 80–100)
POC BE: -2 MMOL/L
POC BE: -3 MMOL/L
POC IONIZED CALCIUM: 1.16 MMOL/L (ref 1.06–1.42)
POC IONIZED CALCIUM: 1.18 MMOL/L (ref 1.06–1.42)
POC SATURATED O2: 93 % (ref 95–100)
POC SATURATED O2: 94 % (ref 95–100)
POC TCO2: 23 MMOL/L (ref 23–27)
POC TCO2: 24 MMOL/L (ref 23–27)
POCT GLUCOSE: 102 MG/DL (ref 70–110)
POCT GLUCOSE: 102 MG/DL (ref 70–110)
POCT GLUCOSE: 104 MG/DL (ref 70–110)
POCT GLUCOSE: 104 MG/DL (ref 70–110)
POCT GLUCOSE: 126 MG/DL (ref 70–110)
POCT GLUCOSE: 137 MG/DL (ref 70–110)
POCT GLUCOSE: 138 MG/DL (ref 70–110)
POCT GLUCOSE: 138 MG/DL (ref 70–110)
POCT GLUCOSE: 147 MG/DL (ref 70–110)
POCT GLUCOSE: 154 MG/DL (ref 70–110)
POCT GLUCOSE: 156 MG/DL (ref 70–110)
POCT GLUCOSE: 156 MG/DL (ref 70–110)
POCT GLUCOSE: 84 MG/DL (ref 70–110)
POCT GLUCOSE: 95 MG/DL (ref 70–110)
POTASSIUM BLD-SCNC: 3.9 MMOL/L (ref 3.5–5.1)
POTASSIUM BLD-SCNC: 4 MMOL/L (ref 3.5–5.1)
POTASSIUM SERPL-SCNC: 3.9 MMOL/L (ref 3.5–5.1)
POTASSIUM SERPL-SCNC: 4.1 MMOL/L (ref 3.5–5.1)
POTASSIUM SERPL-SCNC: 4.1 MMOL/L (ref 3.5–5.1)
PROTHROMBIN TIME: 10.8 SEC (ref 9–12.5)
RBC # BLD AUTO: 3.28 M/UL (ref 4.6–6.2)
SAMPLE: ABNORMAL
SITE: ABNORMAL
SODIUM BLD-SCNC: 145 MMOL/L (ref 136–145)
SODIUM BLD-SCNC: 145 MMOL/L (ref 136–145)
SODIUM SERPL-SCNC: 139 MMOL/L (ref 136–145)
WBC # BLD AUTO: 7.51 K/UL (ref 3.9–12.7)

## 2020-06-06 PROCEDURE — 85730 THROMBOPLASTIN TIME PARTIAL: CPT

## 2020-06-06 PROCEDURE — 25000003 PHARM REV CODE 250: Performed by: NURSE PRACTITIONER

## 2020-06-06 PROCEDURE — 63600175 PHARM REV CODE 636 W HCPCS: Performed by: STUDENT IN AN ORGANIZED HEALTH CARE EDUCATION/TRAINING PROGRAM

## 2020-06-06 PROCEDURE — 63600175 PHARM REV CODE 636 W HCPCS: Performed by: NURSE PRACTITIONER

## 2020-06-06 PROCEDURE — 97116 GAIT TRAINING THERAPY: CPT

## 2020-06-06 PROCEDURE — 83735 ASSAY OF MAGNESIUM: CPT

## 2020-06-06 PROCEDURE — C9113 INJ PANTOPRAZOLE SODIUM, VIA: HCPCS | Performed by: NURSE PRACTITIONER

## 2020-06-06 PROCEDURE — 84100 ASSAY OF PHOSPHORUS: CPT

## 2020-06-06 PROCEDURE — 94761 N-INVAS EAR/PLS OXIMETRY MLT: CPT

## 2020-06-06 PROCEDURE — 84132 ASSAY OF SERUM POTASSIUM: CPT | Mod: 91

## 2020-06-06 PROCEDURE — 99233 SBSQ HOSP IP/OBS HIGH 50: CPT | Mod: ,,, | Performed by: NURSE PRACTITIONER

## 2020-06-06 PROCEDURE — 36415 COLL VENOUS BLD VENIPUNCTURE: CPT

## 2020-06-06 PROCEDURE — 25000003 PHARM REV CODE 250: Performed by: STUDENT IN AN ORGANIZED HEALTH CARE EDUCATION/TRAINING PROGRAM

## 2020-06-06 PROCEDURE — 97161 PT EVAL LOW COMPLEX 20 MIN: CPT

## 2020-06-06 PROCEDURE — 27000221 HC OXYGEN, UP TO 24 HOURS

## 2020-06-06 PROCEDURE — 80048 BASIC METABOLIC PNL TOTAL CA: CPT

## 2020-06-06 PROCEDURE — 82803 BLOOD GASES ANY COMBINATION: CPT

## 2020-06-06 PROCEDURE — 20600001 HC STEP DOWN PRIVATE ROOM

## 2020-06-06 PROCEDURE — 97530 THERAPEUTIC ACTIVITIES: CPT

## 2020-06-06 PROCEDURE — 99233 PR SUBSEQUENT HOSPITAL CARE,LEVL III: ICD-10-PCS | Mod: ,,, | Performed by: NURSE PRACTITIONER

## 2020-06-06 PROCEDURE — 83605 ASSAY OF LACTIC ACID: CPT

## 2020-06-06 PROCEDURE — 85025 COMPLETE CBC W/AUTO DIFF WBC: CPT

## 2020-06-06 PROCEDURE — 97165 OT EVAL LOW COMPLEX 30 MIN: CPT

## 2020-06-06 PROCEDURE — 99291 CRITICAL CARE FIRST HOUR: CPT | Mod: ,,, | Performed by: ANESTHESIOLOGY

## 2020-06-06 PROCEDURE — 94640 AIRWAY INHALATION TREATMENT: CPT

## 2020-06-06 PROCEDURE — 99291 PR CRITICAL CARE, E/M 30-74 MINUTES: ICD-10-PCS | Mod: ,,, | Performed by: ANESTHESIOLOGY

## 2020-06-06 PROCEDURE — 85610 PROTHROMBIN TIME: CPT

## 2020-06-06 PROCEDURE — 25000242 PHARM REV CODE 250 ALT 637 W/ HCPCS: Performed by: NURSE PRACTITIONER

## 2020-06-06 PROCEDURE — 36600 WITHDRAWAL OF ARTERIAL BLOOD: CPT

## 2020-06-06 PROCEDURE — 99900035 HC TECH TIME PER 15 MIN (STAT)

## 2020-06-06 RX ORDER — FUROSEMIDE 10 MG/ML
40 INJECTION INTRAMUSCULAR; INTRAVENOUS
Status: DISCONTINUED | OUTPATIENT
Start: 2020-06-06 | End: 2020-06-10 | Stop reason: HOSPADM

## 2020-06-06 RX ORDER — IBUPROFEN 200 MG
24 TABLET ORAL
Status: DISCONTINUED | OUTPATIENT
Start: 2020-06-06 | End: 2020-06-07

## 2020-06-06 RX ORDER — INSULIN ASPART 100 [IU]/ML
0-4 INJECTION, SOLUTION INTRAVENOUS; SUBCUTANEOUS
Status: DISCONTINUED | OUTPATIENT
Start: 2020-06-06 | End: 2020-06-07

## 2020-06-06 RX ORDER — METOPROLOL TARTRATE 25 MG/1
12.5 TABLET ORAL 2 TIMES DAILY
Status: DISCONTINUED | OUTPATIENT
Start: 2020-06-06 | End: 2020-06-06

## 2020-06-06 RX ORDER — IBUPROFEN 200 MG
16 TABLET ORAL
Status: DISCONTINUED | OUTPATIENT
Start: 2020-06-06 | End: 2020-06-07

## 2020-06-06 RX ORDER — GLUCAGON 1 MG
1 KIT INJECTION
Status: DISCONTINUED | OUTPATIENT
Start: 2020-06-06 | End: 2020-06-07

## 2020-06-06 RX ORDER — METOPROLOL TARTRATE 25 MG/1
12.5 TABLET ORAL 2 TIMES DAILY
Status: DISCONTINUED | OUTPATIENT
Start: 2020-06-06 | End: 2020-06-07

## 2020-06-06 RX ORDER — METOPROLOL TARTRATE 25 MG/1
25 TABLET, FILM COATED ORAL 2 TIMES DAILY
Status: DISCONTINUED | OUTPATIENT
Start: 2020-06-06 | End: 2020-06-06

## 2020-06-06 RX ADMIN — METOPROLOL TARTRATE 12.5 MG: 25 TABLET, FILM COATED ORAL at 08:06

## 2020-06-06 RX ADMIN — ATORVASTATIN CALCIUM 80 MG: 20 TABLET, FILM COATED ORAL at 08:06

## 2020-06-06 RX ADMIN — MUPIROCIN 1 G: 20 OINTMENT TOPICAL at 08:06

## 2020-06-06 RX ADMIN — VANCOMYCIN HYDROCHLORIDE 1500 MG: 1.5 INJECTION, POWDER, LYOPHILIZED, FOR SOLUTION INTRAVENOUS at 05:06

## 2020-06-06 RX ADMIN — ACETAMINOPHEN 1000 MG: 10 INJECTION, SOLUTION INTRAVENOUS at 01:06

## 2020-06-06 RX ADMIN — POLYETHYLENE GLYCOL 3350 17 G: 17 POWDER, FOR SOLUTION ORAL at 08:06

## 2020-06-06 RX ADMIN — FUROSEMIDE 40 MG: 10 INJECTION, SOLUTION INTRAMUSCULAR; INTRAVENOUS at 11:06

## 2020-06-06 RX ADMIN — OXYCODONE HYDROCHLORIDE 5 MG: 5 TABLET ORAL at 05:06

## 2020-06-06 RX ADMIN — DOCUSATE SODIUM 100 MG: 100 CAPSULE, LIQUID FILLED ORAL at 08:06

## 2020-06-06 RX ADMIN — OXYCODONE HYDROCHLORIDE 5 MG: 5 TABLET ORAL at 09:06

## 2020-06-06 RX ADMIN — ASPIRIN 325 MG ORAL TABLET 325 MG: 325 PILL ORAL at 08:06

## 2020-06-06 RX ADMIN — IPRATROPIUM BROMIDE AND ALBUTEROL SULFATE 3 ML: .5; 3 SOLUTION RESPIRATORY (INHALATION) at 08:06

## 2020-06-06 RX ADMIN — PANTOPRAZOLE SODIUM 40 MG: 40 INJECTION, POWDER, LYOPHILIZED, FOR SOLUTION INTRAVENOUS at 08:06

## 2020-06-06 RX ADMIN — SODIUM CHLORIDE 0.8 UNITS/HR: 9 INJECTION, SOLUTION INTRAVENOUS at 01:06

## 2020-06-06 RX ADMIN — ACETAMINOPHEN 1000 MG: 10 INJECTION, SOLUTION INTRAVENOUS at 05:06

## 2020-06-06 RX ADMIN — IPRATROPIUM BROMIDE AND ALBUTEROL SULFATE 3 ML: .5; 3 SOLUTION RESPIRATORY (INHALATION) at 03:06

## 2020-06-06 RX ADMIN — METOPROLOL TARTRATE 12.5 MG: 25 TABLET, FILM COATED ORAL at 12:06

## 2020-06-06 RX ADMIN — OXYCODONE HYDROCHLORIDE 10 MG: 10 TABLET ORAL at 08:06

## 2020-06-06 RX ADMIN — OXYCODONE HYDROCHLORIDE 10 MG: 10 TABLET ORAL at 12:06

## 2020-06-06 RX ADMIN — IPRATROPIUM BROMIDE AND ALBUTEROL SULFATE 3 ML: .5; 3 SOLUTION RESPIRATORY (INHALATION) at 02:06

## 2020-06-06 NOTE — RESPIRATORY THERAPY
Pt extubated at this time. No respiratory distress noted. NIF -35 FVC 1009ml and RSBI at 43. Postive cuff leak test, No stridor heard post extubation. Pt on 4L NC  SPo2 97%

## 2020-06-06 NOTE — ASSESSMENT & PLAN NOTE
Jeffery Melton is a 64 y.o. male s/p CABG x 3 with pacer wires for CAD on 06/06/2020     Neuro  PRN dilaudid/judy     Resp  Extubated, now on NC     Cards  Pressors: off  Chest tubes to suction  Aspirin 325  Keep SBP < 140; MAPs 65    Renal  Nunez in place  Cr 0.8 this AM    FEN  Cardiac diet  Replace lytes as needed     GI  Cardiac diet  Bowel regimen     Endo  Insulin gtt off  Endo on board, appreciate recs    ID  Periop vanc last given at 9    Heme  hgb 9.1    Dispo: Floor

## 2020-06-06 NOTE — PROGRESS NOTES
"[facility]  Endocrinology  Progress Note    Admit Date: 6/5/2020     Reason for Consult: Management of Hyperglycemia     Surgical Procedure and Date: CABG x2 6/5/20    Lab Results   Component Value Date    HGBA1C 5.8 (H) 06/04/2020     HPI:   Patient is a 64 y.o. male with a diagnosis of asthma, CAD, HLD, and HTN who presents to [facility] via virtual visit for possible CABG. Patient had a [facility] cath perfomred in November of 2019 and was was told that he had lesion that were not stentable.  Patient had a [facility] cath perfomred in November of 2019 and was was told that he had lesion that were not stentable. He was admitted to SICU s/p CABG by  Dr. Ceron on 6/5/20. Endocrinology consulted for management of hyperglycemia.    Interval HPI:   Overnight events: Remains in SICU extubated yesterday. BG well controlled on IV intensive insulin protocol with infusion rates ranging from 1.2-2.2 u/hr.   Eating:   NPO  Nausea: No  Hypoglycemia and intervention: No  Fever: No  TPN and/or TF: No  If yes, type of TF/TPN and rate: none    BP (!) 146/74   Pulse 76   Temp 98.2 °F (36.8 °C) (Oral)   Resp 18   Ht 5' 11" (1.803 m)   Wt 103 kg (227 lb 1.2 oz)   SpO2 100%   BMI 31.67 kg/m²      Labs Reviewed and Include    Recent Labs   Lab 06/06/20  0308   *   CALCIUM 8.2*      K 4.1   CO2 22*      BUN 23   CREATININE 0.8     Lab Results   Component Value Date    WBC 7.51 06/06/2020    HGB 9.1 (L) 06/06/2020    HCT 25 (L) 06/06/2020    MCV 92 06/06/2020     06/06/2020     No results for input(s): TSH, FREET4 in the last 168 hours.  Lab Results   Component Value Date    HGBA1C 5.8 (H) 06/04/2020       Nutritional status:   Body mass index is 31.67 kg/m².  Lab Results   Component Value Date    ALBUMIN 3.8 06/04/2020     No results found for: PREALBUMIN    Estimated Creatinine Clearance: 115.5 mL/min (based on SCr of 0.8 mg/dL).    Accu-Checks  Recent Labs     06/05/20  2233 06/05/20  2313 " 06/06/20  0005 06/06/20  0100 06/06/20  0203 06/06/20  0313 06/06/20  0409 06/06/20  0500 06/06/20  0708 06/06/20  0812   POCTGLUCOSE 153* 138* 156* 138* 137* 154* 156* 147* 126* 102       Current Medications and/or Treatments Impacting Glycemic Control  Immunotherapy:    Immunosuppressants     None        Steroids:   Hormones (From admission, onward)    None        Pressors:    Autonomic Drugs (From admission, onward)    Start     Stop Route Frequency Ordered    06/05/20 1545  norepinephrine 4 mg in dextrose 5% 250 mL infusion (premix) (titrating)     Question Answer Comment   Titrate by: (in mcg/kg/min) 0.02    Titrate interval: (in minutes) 15    Titrate to maintain: (MAP or SBP) MAP    Greater than: (in mmHg) 60    Maximum dose: (in mcg/kg/min) 3        -- IV Continuous 06/05/20 1440    06/05/20 1445  EPINEPHrine (ADRENALIN) 5 mg in sodium chloride 0.9% 250 mL infusion     Question Answer Comment   Titrate by: (in mcg/kg/min) 0.02    Titrate interval: (in minutes) 5    Titrate to maintain: (SBP or MAP or Cardiac Index) MAP    Greater than: (in mmHg) 65    Cardiac index greater than: (in L/min) 2.2    Maximum dose: (in mcg/kg/min) 2        -- IV Continuous 06/05/20 1415        Hyperglycemia/Diabetes Medications:   Antihyperglycemics (From admission, onward)    Start     Stop Route Frequency Ordered    06/05/20 1445  insulin regular 100 Units in sodium chloride 0.9% 100 mL infusion     Question:  Insulin rate changes (DO NOT MODIFY ANSWER)  Answer:  [email or web address]\InsulinInfusions\CTS INSULIN [id number].pdf    -- IV Continuous 06/05/20 1415          ASSESSMENT and PLAN    * S/P CABG (coronary artery bypass graft)  Managed per primary team  S/p CABG 6/5  Optimize BG control    Hyperglycemia  BG goal 110-140  BG well controlled on IV insulin. Patient may stepdown today.    Discontinue IV insulin infusion protocol  Start transition IV insulin infusion at 1.2 u/hr with stepdown parameters (rate based  off of current IV insulin requirements)   Low Dose Correction Scale  BG monitoring q 4 hrs while NPO    ** Please call Endocrine for any BG related issues **  ** Please notify Endocrine for any change and/or advance in diet**    Discharge planning: TBD        Atherosclerosis of native coronary artery of native heart with angina pectoris  S/p CABG       Class 1 obesity due to excess calories with serious comorbidity and body mass index (BMI) of 31.0 to 31.9 in adult  Body mass index is 31.67 kg/m².  May increase insulin resistance.             Casie Nieto, NP  Endocrinology  [facility]

## 2020-06-06 NOTE — PROGRESS NOTES
Patient on 4L NC, all VSS at this time, hypertensive with pain, meds given to control pain, latest CVP 14. 1750 mL albumin given through shift. Epi off at 0500, insulin at 2.2 u/hr. Nunez output adequate through shift, see flow sheet for details. Patient OOBTC in AM, with 2 assist. Pacer @ backup setting. All questions answered by RN, will continue to monitor.    Skin with no new breakdown through shift, patient turned Q2 and out of bed in AM.

## 2020-06-06 NOTE — ASSESSMENT & PLAN NOTE
BG goal 110-140  BG well controlled on IV insulin. Patient may stepdown today.    Discontinue IV insulin infusion protocol  Start transition IV insulin infusion at 1.2 u/hr with stepdown parameters (rate based off of current IV insulin requirements)   Low Dose Correction Scale  BG monitoring q 4 hrs while NPO    ** Please call Endocrine for any BG related issues **  ** Please notify Endocrine for any change and/or advance in diet**    Discharge planning: KAY

## 2020-06-06 NOTE — PLAN OF CARE
Problem: Physical Therapy Goal  Goal: Physical Therapy Goal  Description  Goals to be met by: 2020    Patient will increase functional independence with mobility by performin. Supine to sit with Contact Guard Assistance - not met  2. Sit to stand transfer with Supervision - not met  3. Gait  x 150 feet with Supervision - not met  4. Ascend/descend 6 stair with bilateral Handrails Contact Guard Assistance - not met     Outcome: Ongoing, Progressing     Eval completed and goals appropriate

## 2020-06-06 NOTE — NURSING TRANSFER
Nursing Transfer Note      6/6/2020     Transfer To: 316    Transfer via bed    Transfer with O2, cardiac monitoring    Transported by 1 RN and 1 PCT    Medicines sent: yes    Chart send with patient: Yes    Notified: spouse at bedside    Patient reassessed at: 1625    Upon arrival to floor: cardiac monitor applied, patient oriented to room, call bell in reach and bed in lowest position. Plan of care reviewed. All questions and concerns addressed. Will continue to monitor closely.

## 2020-06-06 NOTE — SUBJECTIVE & OBJECTIVE
"Interval HPI:   Overnight events: Remains in SICU extubated yesterday. BG well controlled on IV intensive insulin protocol with infusion rates ranging from 1.2-2.2 u/hr.   Eating:   NPO  Nausea: No  Hypoglycemia and intervention: No  Fever: No  TPN and/or TF: No  If yes, type of TF/TPN and rate: none    BP (!) 146/74   Pulse 76   Temp 98.2 °F (36.8 °C) (Oral)   Resp 18   Ht 5' 11" (1.803 m)   Wt 103 kg (227 lb 1.2 oz)   SpO2 100%   BMI 31.67 kg/m²     Labs Reviewed and Include    Recent Labs   Lab 06/06/20  0308   *   CALCIUM 8.2*      K 4.1   CO2 22*      BUN 23   CREATININE 0.8     Lab Results   Component Value Date    WBC 7.51 06/06/2020    HGB 9.1 (L) 06/06/2020    HCT 25 (L) 06/06/2020    MCV 92 06/06/2020     06/06/2020     No results for input(s): TSH, FREET4 in the last 168 hours.  Lab Results   Component Value Date    HGBA1C 5.8 (H) 06/04/2020       Nutritional status:   Body mass index is 31.67 kg/m².  Lab Results   Component Value Date    ALBUMIN 3.8 06/04/2020     No results found for: PREALBUMIN    Estimated Creatinine Clearance: 115.5 mL/min (based on SCr of 0.8 mg/dL).    Accu-Checks  Recent Labs     06/05/20  2233 06/05/20  2313 06/06/20  0005 06/06/20  0100 06/06/20  0203 06/06/20  0313 06/06/20  0409 06/06/20  0500 06/06/20  0708 06/06/20  0812   POCTGLUCOSE 153* 138* 156* 138* 137* 154* 156* 147* 126* 102       Current Medications and/or Treatments Impacting Glycemic Control  Immunotherapy:    Immunosuppressants     None        Steroids:   Hormones (From admission, onward)    None        Pressors:    Autonomic Drugs (From admission, onward)    Start     Stop Route Frequency Ordered    06/05/20 1545  norepinephrine 4 mg in dextrose 5% 250 mL infusion (premix) (titrating)     Question Answer Comment   Titrate by: (in mcg/kg/min) 0.02    Titrate interval: (in minutes) 15    Titrate to maintain: (MAP or SBP) MAP    Greater than: (in mmHg) 60    Maximum dose: (in " mcg/kg/min) 3        -- IV Continuous 06/05/20 1440    06/05/20 1445  EPINEPHrine (ADRENALIN) 5 mg in sodium chloride 0.9% 250 mL infusion     Question Answer Comment   Titrate by: (in mcg/kg/min) 0.02    Titrate interval: (in minutes) 5    Titrate to maintain: (SBP or MAP or Cardiac Index) MAP    Greater than: (in mmHg) 65    Cardiac index greater than: (in L/min) 2.2    Maximum dose: (in mcg/kg/min) 2        -- IV Continuous 06/05/20 1415        Hyperglycemia/Diabetes Medications:   Antihyperglycemics (From admission, onward)    Start     Stop Route Frequency Ordered    06/05/20 1445  insulin regular 100 Units in sodium chloride 0.9% 100 mL infusion     Question:  Insulin rate changes (DO NOT MODIFY ANSWER)  Answer:  [email or web address]\Images\Pharmacy\InsulinInfusions\CTS INSULIN [id number].pdf    -- IV Continuous 06/05/20 1417

## 2020-06-06 NOTE — PLAN OF CARE
Problem: Occupational Therapy Goal  Goal: Occupational Therapy Goal  Description  Goals to be met by: 6/16/20     Patient will increase functional independence with ADLs by performing:    Feeding with [place].  UE Dressing with Supervision  LE Dressing with Supervision.  Grooming while standing at sink with Supervision.  Toileting from toilet with Supervision for hygiene and clothing management.   Toilet transfer to toilet with Supervision.     Outcome: Ongoing, Progressing   OT eval completed, and above goals established. ANNE-MARIE Doss  6/6/2020

## 2020-06-06 NOTE — SUBJECTIVE & OBJECTIVE
Interval History/Significant Events:   Did great overnight.  Extubated/off pressors.    Follow-up For: Procedure(s) (LRB):  CORONARY ARTERY BYPASS GRAFT (CABG) x 3  (N/A)    Post-Operative Day: 1 Day Post-Op    Objective:     Vital Signs (Most Recent):  Temp: 98.2 °F (36.8 °C) (06/06/20 0700)  Pulse: 83 (06/06/20 0700)  Resp: (!) 25 (06/06/20 0700)  BP: (!) 146/74 (06/06/20 0100)  SpO2: 100 % (06/06/20 0700) Vital Signs (24h Range):  Temp:  [98.2 °F (36.8 °C)-98.5 °F (36.9 °C)] 98.2 °F (36.8 °C)  Pulse:  [60-96] 83  Resp:  [18-40] 25  SpO2:  [85 %-100 %] 100 %  BP: ()/(48-74) 146/74  Arterial Line BP: ()/(42-64) 135/52     Weight: 103 kg (227 lb 1.2 oz)  Body mass index is 31.67 kg/m².      Intake/Output Summary (Last 24 hours) at 6/6/2020 0737  Last data filed at 6/6/2020 0700  Gross per 24 hour   Intake 3045 ml   Output 2440 ml   Net 605 ml       Physical Exam   Constitutional: He appears well-developed and well-nourished.   HENT:   Head: Normocephalic and atraumatic.   Eyes: Right eye exhibits no discharge. Left eye exhibits no discharge. No scleral icterus.   Neck: No JVD present.   RIJ in place   Cardiovascular: Normal rate and regular rhythm.   Pulmonary/Chest:   Intubated. Sternotomy with dressing in place. Incision c/di.  Chest tubes x 3 in place connected to atrium. Pacer wires in place.   Abdominal: Soft. He exhibits no distension.   Colostomy in place in LLQ   Genitourinary:   Genitourinary Comments: Nunez in place   Musculoskeletal:   Left leg with ace bandage in place   Neurological:   sedated   Skin: Skin is warm and dry.   Vitals reviewed.      Vents:  Vent Mode: Spont (06/05/20 2024)  Ventilator Initiated: Yes (06/05/20 1420)  Set Rate: 25 BPM (06/05/20 1618)  Vt Set: 500 mL (06/05/20 1618)  Pressure Support: 8 cmH20 (06/05/20 2024)  PEEP/CPAP: 5 cmH20 (06/05/20 2024)  Oxygen Concentration (%): 40 (06/05/20 2030)  Peak Airway Pressure: 14 cmH2O (06/05/20 2024)  Plateau Pressure: 24 cmH20  (06/05/20 2024)  Total Ve: 12.2 mL (06/05/20 2024)  Negative Inspiratory Force (cm H2O): -35 (06/05/20 2040)  F/VT Ratio<105 (RSBI): (!) 52.93 (06/05/20 2024)    Lines/Drains/Airways     Central Venous Catheter Line             Introducer with Double Lumen 06/05/20 1430 right internal jugular less than 1 day    Percutaneous Central Line Insertion/Assessment - Triple Lumen  06/05/20 1430 right internal jugular less than 1 day          Drain                 Colostomy LLQ -- days         Urethral Catheter 06/05/20 0726 Straight-tip;Temperature probe 1 day         Y Chest Tube 1 and 2 06/05/20 1300 Right Mediastinal 19 Fr. Mediastinal 19 Fr. less than 1 day         Y Chest Tube 3 and 4 06/05/20 1300 Left Pleural 19 Fr. less than 1 day          Arterial Line                 Arterial Line 06/05/20 0710 Left Radial 1 day          Line                 Pacer Wires 06/05/20 1 day          Peripheral Intravenous Line                 Peripheral IV - Single Lumen 06/05/20 0545 20 G Left Hand 1 day                Significant Labs:    CBC/Anemia Profile:  Recent Labs   Lab 06/05/20  1415  06/05/20  1825  06/06/20  0031 06/06/20  0308 06/06/20  0503   WBC 17.59*  --  15.00*  --   --  7.51  --    HGB 11.9*  --  10.4*  --   --  9.1*  --    HCT 36.3*   < > 32.3*   < > 26* 30.2* 25*     --  220  --   --  183  --    MCV 88  --  89  --   --  92  --    RDW 14.0  --  14.2  --   --  14.4  --     < > = values in this interval not displayed.        Chemistries:  Recent Labs   Lab 06/04/20  1043 06/05/20  1415 06/05/20 2007 06/06/20  0308    141  --  139   K 4.2 4.5 4.2 4.1    111*  --  109   CO2 28 23  --  22*   BUN 20 20  --  23   CREATININE 0.9 0.8  --  0.8   CALCIUM 9.3 8.6*  --  8.2*   ALBUMIN 3.8  --   --   --    PROT 7.6  --   --   --    BILITOT 0.7  --   --   --    ALKPHOS 105  --   --   --    ALT 27  --   --   --    AST 22  --   --   --    MG  --  2.6  2.6  --  2.1   PHOS  --  3.7  3.7  --  2.7       Significant  Imaging:  I have reviewed and interpreted all pertinent imaging results/findings within the past 24 hours.

## 2020-06-06 NOTE — PROGRESS NOTES
Cardiothoracic Surgery  Progress Note      Admit Date: 6/5/2020    Disease Etiology: Ischemic  Open Chest: no  Surgery Performed: 6/5/2020 3V CABG    ASSESSMENT/PLAN:     I conducted multidisciplinary rounds in conjunction with the ICU/Critical Care attending staff and/or residents, with involvement of ancillary services as appropriate. The patient's general condition was reviewed, specifically including hemodynamic performance, dietary and nutritional status, pharmacy concerns, and status of expected transition to stepdown care. Plan has been discussed and agreed upon.    Plan:  Did well overnight, on 3L NC O2. Starting lasix 40 IV BID, metop 25 BID, has asa/statin. If does well, can stepdown to CTSU.     For details see the critical care note.    Kailey Greer MD  Cardiac Surgery Resident, PGY7  Cardiothoracic Surgery  [facility]

## 2020-06-06 NOTE — PLAN OF CARE
"      SICU PLAN OF CARE NOTE    Dx: S/P CABG (coronary artery bypass graft)    Goals of Care:  gtts titrated to maintain MAP 60-80    Vital Signs: /62   Pulse 78   Temp 98.3 °F (36.8 °C) (Oral)   Resp (!) 21   Ht 5' 11" (1.803 m)   Wt 103 kg (227 lb 1.2 oz)   SpO2 95%   BMI 31.67 kg/m²     Exam: appears acutely ill, sedated, intubated, well developed, well nourished, mild distress    Cardiac: NSR, V wires attached to pacer, back up rate at 50 bpm    Resp: intubated, titrated vent support per ABGs; switched from AC to spontaneous at 1800. Patient tolerated well, next ABG at 2000    Neuro: sedated, follows minimal commands and moves all extremeties; episode of agitation- unable to be redirected.  Mostly calm with small amount of propofol but propofol was weaned off and switched to precedex. patient tolerating well.     Gtts: Precedex 0.2, Epi 0.02, Titrating insulin; currently @ 1unit of insulin     Urine Output: Urinary Catheter 295 cc/shift    Drains: Right Mediastinal 1 and 2 total output 390 cc/ shift, Left pleural 410 cc/shift    Diet: NPO     Labs/Accuchecks: Accuchecks Q1, ABG Q2    Skin: incision WNL with no drainage, LLQ colostomy with peristomal hermia     Shift Events: Report received from Anesthesia. Pt transported to SICU 13132 with portable telemetry. Pt connected to ICU monitor and Ventilator. Charge RN, CTS team and SI team called and made aware of patient arrival. New orders received and implemented. Pt assessed, immediate needs met.CTS MD made aware of large Chest tube output near admit and subsequent drop off of Chest tube output before shift change. Daughter brought to bedside, updated on the patient's current condition and plan of care. Family also given ICU Welcome packet and educated on visiting hours. All questions answered, emotional support provided. Will continue to monitor.         "

## 2020-06-06 NOTE — PT/OT/SLP EVAL
"Occupational Therapy   Evaluation    Name: Jeffery Melton  MRN: [id number]  Admitting Diagnosis:  S/P CABG (coronary artery bypass graft) 1 Day Post-Op    Recommendations:     Discharge Recommendations: home  Discharge Equipment Recommendations:  none  Barriers to discharge:  None    Assessment:     Jeffery Melton is a 64 y.o. male with a medical diagnosis of S/P CABG (coronary artery bypass graft).  Performance deficits affecting function: weakness, gait instability, decreased upper extremity function, impaired endurance, impaired balance, impaired cardiopulmonary response to activity, impaired functional mobilty, decreased safety awareness.      Rehab Prognosis: Good; patient would benefit from acute skilled OT services to address these deficits and reach maximum level of function.       Plan:     Patient to be seen 5 x/week to address the above listed problems via self-care/home management, therapeutic activities, therapeutic exercises  · Plan of Care Expires: 07/06/20  · Plan of Care Reviewed with: patient    Subjective     Chief Complaint: "I'm fuzzy."  Patient/Family Comments/goals: to get better and go home    Occupational Profile:  Living Environment: lives with spouse in [facility] with 6 FATOUMATA and B HR  Previous level of function: (I) with ADL and ambulation  Roles and Routines: retired  Equipment Used at Home:  none  Assistance upon Discharge: spouse can assist    Pain/Comfort:  · Pain Rating 1: 6/10  · Location - Side 1: Bilateral  · Location - Orientation 1: midline  · Location 1: chest  · Pain Addressed 1: Reposition, Distraction  · Pain Rating Post-Intervention 1: 6/10    Patients cultural, spiritual, Druze conflicts given the current situation: no    Objective:     Communicated with: RN prior to session.  Patient found up in chair with blood pressure cuff, pulse ox (continuous), telemetry, chest tube, arterial line, central line, burns catheter(pacer) upon OT entry to room.    General Precautions: Standard, " fall, sternal   Orthopedic Precautions:    Braces:       Occupational Performance:    Bed Mobility:    · NT    Functional Mobility/Transfers:  · Patient completed Sit <> Stand Transfer with contact guard assistance  with  no assistive device   · Functional Mobility: CGA x ~12 feet    Activities of Daily Living:  · Grooming: contact guard assistance in standing simulated  · Upper Body Dressing: moderate assistance    · Lower Body Dressing: maximal assistance      Cognitive/Visual Perceptual:  Pt is alert, grossly oriented, though admits to confusion and fuzziness  Pt is  following commands    Physical Exam:  Postural examination/scapula alignment:    -       No postural abnormalities identified  Sensation:    -       Intact  Upper Extremity Range of Motion:     -       Right Upper Extremity: WFL  -       Left Upper Extremity: WFL  Upper Extremity Strength:    -       Right Upper Extremity: WFL  -       Left Upper Extremity: WFL   Strength:    -       Right Upper Extremity: WFL  -       Left Upper Extremity: WFL  Fine Motor Coordination:    -       Intact  Gross motor coordination:   WFL    AMPAC 6 Click ADL:  AMPAC Total Score: 14    Treatment & Education:  Pt ed on OT POC  Pt ed on sternal precautions during ADL; handout provided  Pt ed on ROM ex's 3x daily for increased overall strength and endurance  Education:    Patient left up in chair with all lines intact, call button in reach and RN notified    GOALS:   Multidisciplinary Problems     Occupational Therapy Goals        Problem: Occupational Therapy Goal    Goal Priority Disciplines Outcome Interventions   Occupational Therapy Goal     OT, PT/OT Ongoing, Progressing    Description:  Goals to be met by: 6/16/20     Patient will increase functional independence with ADLs by performing:    Feeding with [place].  UE Dressing with Supervision  LE Dressing with Supervision.  Grooming while standing at sink with Supervision.  Toileting from toilet with  Supervision for hygiene and clothing management.   Toilet transfer to toilet with Supervision.                      History:     Past Medical History:   Diagnosis Date    Asthma     CAD (coronary artery disease)     Hyperlipidemia     Hypertension        History reviewed. No pertinent surgical history.    Time Tracking:     OT Date of Treatment: 06/06/20  OT Start Time: 0811  OT Stop Time: 0826  OT Total Time (min): 15 min    Billable Minutes:Evaluation 7  Self Care/Home Management 8    ANNE-MARIE Doss  6/6/2020

## 2020-06-06 NOTE — PT/OT/SLP EVAL
Physical Therapy Evaluation    Patient Name:  Jeffery Melton   MRN:  [id number]  Admit Date: 6/5/2020  Admitting Diagnosis:  S/P CABG (coronary artery bypass graft)  Length of Stay: 1 days  Recent Surgery: Procedure(s) (LRB):  CORONARY ARTERY BYPASS GRAFT (CABG) x 3  (N/A) 1 Day Post-Op    Recommendations:     Discharge Recommendations:  home   Discharge Equipment Recommendations: none   Barriers to discharge: None    Assessment:     Jeffery Melton is a 64 y.o. male admitted with a medical diagnosis of S/P CABG (coronary artery bypass graft).      Problem List: impaired endurance, impaired functional mobilty, impaired self care skills, gait instability, impaired balance, decreased upper extremity function, pain, impaired cardiopulmonary response to activity  Rehab Prognosis: Good; patient would benefit from acute skilled PT services to address these deficits and reach maximum level of function.      Plan:     During this hospitalization, patient to be seen 5 x/week to address the identified rehab impairments via gait training, therapeutic activities, therapeutic exercises, neuromuscular re-education and progress towards the established goals.    · Plan of Care Expires:  07/05/20    Subjective   Communicated with RN prior to session.  Patient found up in chair upon PT entry to room, agreeable to evaluation. Jeffery Melton's alone during session.    Chief Complaint: No chief complaint on file.    Patient/Family Comments/goals: to get better and return home   Pain/Comfort:  · Pain Rating 1: 8/10(sternum, L flank, abdomen)  · Pain Addressed 1: Reposition, Distraction  · Pain Rating Post-Intervention 1: 6/10    Living Environment:  Living Environment: lives with spouse in [facility] with 6 FATOUMATA and B HR  Previous level of function: (I) with ADL and ambulation  Roles and Routines: retired  Equipment Used at Home:  none  Assistance upon Discharge: spouse can assist    Objective:   Patient found with: telemetry, pulse ox (continuous), blood  "pressure cuff, oxygen, central line, peripheral IV, colostomy, burns catheter, arterial line(external pacer)     General Precautions: Standard, Cardiac fall, sternal   Orthopedic Precautions:N/A   Braces: N/A   Oxygen Device: Nasal Cannula   Vitals: BP (!) 109/58 (BP Location: Right arm, Patient Position: Sitting)   Pulse 91   Temp 99.7 °F (37.6 °C) (Oral)   Resp (!) 28   Ht 5' 11" (1.803 m)   Wt 103 kg (227 lb 1.2 oz)   SpO2 96%   BMI 31.67 kg/m²     Exams:  · Cognition:   · Alert and Cooperative  · Ox4  · Command following: Follows multistep  commands  · Fluency: clear/fluent    · RLE ROM: WFL  · RLE Strength: WFL  · LLE ROM: WFL  · LLE Strength: WFL    Outcome Measures:  AM-PAC 6 CLICK MOBILITY  Turning over in bed (including adjusting bedclothes, sheets and blankets)?: 2  Sitting down on and standing up from a chair with arms (e.g., wheelchair, bedside commode, etc.): 3  Moving from lying on back to sitting on the side of the bed?: 2  Moving to and from a bed to a chair (including a wheelchair)?: 3  Need to walk in hospital room?: 3  Climbing 3-5 steps with a railing?: 2  Basic Mobility Total Score: 15     Functional Mobility:  Additional staff present: OT  Bed Mobility:  Not performed 2nd to pt found in chair     Transfers:   · Sit <> Stand Transfer: contact guard assistance with no assistive device from chair       Gait:   · Patient ambulated: 4ft forward/backward x 3    · Patient required: contact guard  · Patient used: no assistive device  · Gait Pattern observed: reciprocal gait  · Gait Deviation(s): decreased step length and decreased eliza  · Impairments due to: pain and post op instability   · Comments:  · Cuing for increased step length     Therapeutic Activities, Exercises, & Education:   Educated pt on PT role/POC  Educated pt on importance of OOB activity and daily ambulation   Educated pt on sternal precautions   Pt verbalized understanding     Patient left up in chair with all lines " intact, call button in reach and RN notified.    GOALS:   Multidisciplinary Problems     Physical Therapy Goals        Problem: Physical Therapy Goal    Goal Priority Disciplines Outcome Goal Variances Interventions   Physical Therapy Goal     PT, PT/OT Ongoing, Progressing     Description:  Goals to be met by: 2020    Patient will increase functional independence with mobility by performin. Supine to sit with Contact Guard Assistance - not met  2. Sit to stand transfer with Supervision - not met  3. Gait  x 150 feet with Supervision - not met  4. Ascend/descend 6 stair with bilateral Handrails Contact Guard Assistance - not met                      History:     Past Medical History:   Diagnosis Date    Asthma     CAD (coronary artery disease)     Hyperlipidemia     Hypertension        History reviewed. No pertinent surgical history.    Time Tracking:     PT Received On: 20  PT Start Time: 0810     PT Stop Time: 0830  PT Total Time (min): 20 min     Billable Minutes: Evaluation 10 and Gait Training 8    Marlene Amador, PT, DPT  2020  [phone]

## 2020-06-06 NOTE — PROGRESS NOTES
[facility]  Critical Care - Surgery  Progress Note    Patient Name: Jeffery Melton  MRN: [id number]  Admission Date: 6/5/2020  Hospital Length of Stay: 1 days  Code Status: Full Code  Attending Provider: Anuj Ceron MD  Primary Care Provider: Primary Doctor No   Principal Problem: S/P CABG (coronary artery bypass graft)    Subjective:     Hospital/ICU Course:  No notes on file    Interval History/Significant Events:   Did great overnight.  Extubated/off pressors.    Follow-up For: Procedure(s) (LRB):  CORONARY ARTERY BYPASS GRAFT (CABG) x 3  (N/A)    Post-Operative Day: 1 Day Post-Op    Objective:     Vital Signs (Most Recent):  Temp: 98.2 °F (36.8 °C) (06/06/20 0700)  Pulse: 83 (06/06/20 0700)  Resp: (!) 25 (06/06/20 0700)  BP: (!) 146/74 (06/06/20 0100)  SpO2: 100 % (06/06/20 0700) Vital Signs (24h Range):  Temp:  [98.2 °F (36.8 °C)-98.5 °F (36.9 °C)] 98.2 °F (36.8 °C)  Pulse:  [60-96] 83  Resp:  [18-40] 25  SpO2:  [85 %-100 %] 100 %  BP: ()/(48-74) 146/74  Arterial Line BP: ()/(42-64) 135/52     Weight: 103 kg (227 lb 1.2 oz)  Body mass index is 31.67 kg/m².      Intake/Output Summary (Last 24 hours) at 6/6/2020 0737  Last data filed at 6/6/2020 0700  Gross per 24 hour   Intake 3045 ml   Output 2440 ml   Net 605 ml       Physical Exam   Constitutional: He appears well-developed and well-nourished.   HENT:   Head: Normocephalic and atraumatic.   Eyes: Right eye exhibits no discharge. Left eye exhibits no discharge. No scleral icterus.   Neck: No JVD present.   RIJ in place   Cardiovascular: Normal rate and regular rhythm.   Pulmonary/Chest:   Intubated. Sternotomy with dressing in place. Incision c/di.  Chest tubes x 3 in place connected to atrium. Pacer wires in place.   Abdominal: Soft. He exhibits no distension.   Colostomy in place in LLQ   Genitourinary:   Genitourinary Comments: Nunez in place   Musculoskeletal:   Left leg with ace bandage in place   Neurological:   sedated    Skin: Skin is warm and dry.   Vitals reviewed.      Vents:  Vent Mode: Spont (06/05/20 2024)  Ventilator Initiated: Yes (06/05/20 1420)  Set Rate: 25 BPM (06/05/20 1618)  Vt Set: 500 mL (06/05/20 1618)  Pressure Support: 8 cmH20 (06/05/20 2024)  PEEP/CPAP: 5 cmH20 (06/05/20 2024)  Oxygen Concentration (%): 40 (06/05/20 2030)  Peak Airway Pressure: 14 cmH2O (06/05/20 2024)  Plateau Pressure: 24 cmH20 (06/05/20 2024)  Total Ve: 12.2 mL (06/05/20 2024)  Negative Inspiratory Force (cm H2O): -35 (06/05/20 2040)  F/VT Ratio<105 (RSBI): (!) 52.93 (06/05/20 2024)    Lines/Drains/Airways     Central Venous Catheter Line             Introducer with Double Lumen 06/05/20 1430 right internal jugular less than 1 day    Percutaneous Central Line Insertion/Assessment - Triple Lumen  06/05/20 1430 right internal jugular less than 1 day          Drain                 Colostomy LLQ -- days         Urethral Catheter 06/05/20 0726 Straight-tip;Temperature probe 1 day         Y Chest Tube 1 and 2 06/05/20 1300 Right Mediastinal 19 Fr. Mediastinal 19 Fr. less than 1 day         Y Chest Tube 3 and 4 06/05/20 1300 Left Pleural 19 Fr. less than 1 day          Arterial Line                 Arterial Line 06/05/20 0710 Left Radial 1 day          Line                 Pacer Wires 06/05/20 1 day          Peripheral Intravenous Line                 Peripheral IV - Single Lumen 06/05/20 0545 20 G Left Hand 1 day                Significant Labs:    CBC/Anemia Profile:  Recent Labs   Lab 06/05/20  1415  06/05/20  1825  06/06/20  0031 06/06/20  0308 06/06/20  0503   WBC 17.59*  --  15.00*  --   --  7.51  --    HGB 11.9*  --  10.4*  --   --  9.1*  --    HCT 36.3*   < > 32.3*   < > 26* 30.2* 25*     --  220  --   --  183  --    MCV 88  --  89  --   --  92  --    RDW 14.0  --  14.2  --   --  14.4  --     < > = values in this interval not displayed.        Chemistries:  Recent Labs   Lab 06/04/20  1043 06/05/20  1415 06/05/20 2007 06/06/20  0304     141  --  139   K 4.2 4.5 4.2 4.1    111*  --  109   CO2 28 23  --  22*   BUN 20 20  --  23   CREATININE 0.9 0.8  --  0.8   CALCIUM 9.3 8.6*  --  8.2*   ALBUMIN 3.8  --   --   --    PROT 7.6  --   --   --    BILITOT 0.7  --   --   --    ALKPHOS 105  --   --   --    ALT 27  --   --   --    AST 22  --   --   --    MG  --  2.6  2.6  --  2.1   PHOS  --  3.7  3.7  --  2.7       Significant Imaging:  I have reviewed and interpreted all pertinent imaging results/findings within the past 24 hours.    Assessment/Plan:     Atherosclerosis of native coronary artery of native heart with angina pectoris  Jeffery Melton is a 64 y.o. male s/p CABG x 3 with pacer wires for CAD on 06/06/2020     Neuro  PRN dilaudid/judy     Resp  Extubated, now on NC     Cards  Pressors: off  Chest tubes to suction  Aspirin 325  Keep SBP < 140; MAPs 65    Renal  Nunez in place  Cr 0.8 this AM    FEN  Cardiac diet  Replace lytes as needed     GI  Cardiac diet  Bowel regimen     Endo  Insulin gtt off  Endo on board, appreciate recs    ID  Periop vanc last given at 9    Heme  hgb 9.1    Dispo: Floor           Critical care was time spent personally by me on the following activities: development of treatment plan with patient or surrogate and bedside caregivers, discussions with consultants, evaluation of patient's response to treatment, examination of patient, ordering and performing treatments and interventions, ordering and review of laboratory studies, ordering and review of radiographic studies, pulse oximetry, re-evaluation of patient's condition.  This critical care time did not overlap with that of any other provider or involve time for any procedures.     Riky Frederick MD  Critical Care - Surgery  [facility]

## 2020-06-07 LAB
ANION GAP SERPL CALC-SCNC: 10 MMOL/L (ref 8–16)
APTT BLDCRRT: 32 SEC (ref 21–32)
BASOPHILS # BLD AUTO: 0.02 K/UL (ref 0–0.2)
BASOPHILS NFR BLD: 0.3 % (ref 0–1.9)
BLD PROD TYP BPU: NORMAL
BLD PROD TYP BPU: NORMAL
BLOOD UNIT EXPIRATION DATE: NORMAL
BLOOD UNIT EXPIRATION DATE: NORMAL
BLOOD UNIT TYPE CODE: 5100
BLOOD UNIT TYPE CODE: 5100
BLOOD UNIT TYPE: NORMAL
BLOOD UNIT TYPE: NORMAL
BUN SERPL-MCNC: 23 MG/DL (ref 8–23)
CALCIUM SERPL-MCNC: 8.6 MG/DL (ref 8.7–10.5)
CHLORIDE SERPL-SCNC: 104 MMOL/L (ref 95–110)
CO2 SERPL-SCNC: 24 MMOL/L (ref 23–29)
CODING SYSTEM: NORMAL
CODING SYSTEM: NORMAL
CREAT SERPL-MCNC: 0.8 MG/DL (ref 0.5–1.4)
DIFFERENTIAL METHOD: ABNORMAL
DISPENSE STATUS: NORMAL
DISPENSE STATUS: NORMAL
EOSINOPHIL # BLD AUTO: 0 K/UL (ref 0–0.5)
EOSINOPHIL NFR BLD: 0.4 % (ref 0–8)
ERYTHROCYTE [DISTWIDTH] IN BLOOD BY AUTOMATED COUNT: 14.8 % (ref 11.5–14.5)
EST. GFR  (AFRICAN AMERICAN): >60 ML/MIN/1.73 M^2
EST. GFR  (NON AFRICAN AMERICAN): >60 ML/MIN/1.73 M^2
GLUCOSE SERPL-MCNC: 97 MG/DL (ref 70–110)
HCT VFR BLD AUTO: 30.4 % (ref 40–54)
HGB BLD-MCNC: 9.4 G/DL (ref 14–18)
IMM GRANULOCYTES # BLD AUTO: 0.02 K/UL (ref 0–0.04)
IMM GRANULOCYTES NFR BLD AUTO: 0.3 % (ref 0–0.5)
INR PPP: 1.1 (ref 0.8–1.2)
LYMPHOCYTES # BLD AUTO: 0.6 K/UL (ref 1–4.8)
LYMPHOCYTES NFR BLD: 7.9 % (ref 18–48)
MAGNESIUM SERPL-MCNC: 2 MG/DL (ref 1.6–2.6)
MCH RBC QN AUTO: 27.6 PG (ref 27–31)
MCHC RBC AUTO-ENTMCNC: 30.9 G/DL (ref 32–36)
MCV RBC AUTO: 89 FL (ref 82–98)
MONOCYTES # BLD AUTO: 1.2 K/UL (ref 0.3–1)
MONOCYTES NFR BLD: 16.3 % (ref 4–15)
NEUTROPHILS # BLD AUTO: 5.7 K/UL (ref 1.8–7.7)
NEUTROPHILS NFR BLD: 74.8 % (ref 38–73)
NRBC BLD-RTO: 0 /100 WBC
NUM UNITS TRANS PACKED RBC: NORMAL
NUM UNITS TRANS PACKED RBC: NORMAL
OVALOCYTES BLD QL SMEAR: ABNORMAL
PHOSPHATE SERPL-MCNC: 1.8 MG/DL (ref 2.7–4.5)
PLATELET # BLD AUTO: 188 K/UL (ref 150–350)
PMV BLD AUTO: 10.3 FL (ref 9.2–12.9)
POCT GLUCOSE: 100 MG/DL (ref 70–110)
POCT GLUCOSE: 88 MG/DL (ref 70–110)
POCT GLUCOSE: 89 MG/DL (ref 70–110)
POCT GLUCOSE: 90 MG/DL (ref 70–110)
POCT GLUCOSE: 98 MG/DL (ref 70–110)
POIKILOCYTOSIS BLD QL SMEAR: SLIGHT
POTASSIUM SERPL-SCNC: 3.7 MMOL/L (ref 3.5–5.1)
POTASSIUM SERPL-SCNC: 3.8 MMOL/L (ref 3.5–5.1)
POTASSIUM SERPL-SCNC: 4.1 MMOL/L (ref 3.5–5.1)
PROTHROMBIN TIME: 11.1 SEC (ref 9–12.5)
RBC # BLD AUTO: 3.4 M/UL (ref 4.6–6.2)
SODIUM SERPL-SCNC: 138 MMOL/L (ref 136–145)
WBC # BLD AUTO: 7.6 K/UL (ref 3.9–12.7)

## 2020-06-07 PROCEDURE — 85025 COMPLETE CBC W/AUTO DIFF WBC: CPT

## 2020-06-07 PROCEDURE — C9113 INJ PANTOPRAZOLE SODIUM, VIA: HCPCS | Performed by: NURSE PRACTITIONER

## 2020-06-07 PROCEDURE — 84132 ASSAY OF SERUM POTASSIUM: CPT

## 2020-06-07 PROCEDURE — 25000003 PHARM REV CODE 250: Performed by: STUDENT IN AN ORGANIZED HEALTH CARE EDUCATION/TRAINING PROGRAM

## 2020-06-07 PROCEDURE — 84100 ASSAY OF PHOSPHORUS: CPT

## 2020-06-07 PROCEDURE — 36415 COLL VENOUS BLD VENIPUNCTURE: CPT

## 2020-06-07 PROCEDURE — 63600175 PHARM REV CODE 636 W HCPCS: Performed by: NURSE PRACTITIONER

## 2020-06-07 PROCEDURE — 20600001 HC STEP DOWN PRIVATE ROOM

## 2020-06-07 PROCEDURE — 83735 ASSAY OF MAGNESIUM: CPT

## 2020-06-07 PROCEDURE — 94761 N-INVAS EAR/PLS OXIMETRY MLT: CPT

## 2020-06-07 PROCEDURE — 94799 UNLISTED PULMONARY SVC/PX: CPT

## 2020-06-07 PROCEDURE — 80048 BASIC METABOLIC PNL TOTAL CA: CPT

## 2020-06-07 PROCEDURE — 63600175 PHARM REV CODE 636 W HCPCS: Performed by: STUDENT IN AN ORGANIZED HEALTH CARE EDUCATION/TRAINING PROGRAM

## 2020-06-07 PROCEDURE — 25000003 PHARM REV CODE 250: Performed by: NURSE PRACTITIONER

## 2020-06-07 PROCEDURE — 85730 THROMBOPLASTIN TIME PARTIAL: CPT

## 2020-06-07 PROCEDURE — 85610 PROTHROMBIN TIME: CPT

## 2020-06-07 PROCEDURE — 27000221 HC OXYGEN, UP TO 24 HOURS

## 2020-06-07 RX ORDER — METOPROLOL TARTRATE 25 MG/1
25 TABLET, FILM COATED ORAL 2 TIMES DAILY
Status: DISCONTINUED | OUTPATIENT
Start: 2020-06-07 | End: 2020-06-10 | Stop reason: HOSPADM

## 2020-06-07 RX ORDER — POTASSIUM CHLORIDE 20 MEQ/1
20 TABLET, EXTENDED RELEASE ORAL DAILY
Status: DISCONTINUED | OUTPATIENT
Start: 2020-06-07 | End: 2020-06-08

## 2020-06-07 RX ORDER — ACETAMINOPHEN 500 MG
1000 TABLET ORAL EVERY 8 HOURS
Status: DISCONTINUED | OUTPATIENT
Start: 2020-06-07 | End: 2020-06-10 | Stop reason: HOSPADM

## 2020-06-07 RX ORDER — INSULIN ASPART 100 [IU]/ML
0-5 INJECTION, SOLUTION INTRAVENOUS; SUBCUTANEOUS
Status: DISCONTINUED | OUTPATIENT
Start: 2020-06-07 | End: 2020-06-08

## 2020-06-07 RX ORDER — IBUPROFEN 200 MG
24 TABLET ORAL
Status: DISCONTINUED | OUTPATIENT
Start: 2020-06-07 | End: 2020-06-08

## 2020-06-07 RX ORDER — METHOCARBAMOL 500 MG/1
500 TABLET, FILM COATED ORAL 4 TIMES DAILY
Status: DISCONTINUED | OUTPATIENT
Start: 2020-06-07 | End: 2020-06-10 | Stop reason: HOSPADM

## 2020-06-07 RX ORDER — GLUCAGON 1 MG
1 KIT INJECTION
Status: DISCONTINUED | OUTPATIENT
Start: 2020-06-07 | End: 2020-06-08

## 2020-06-07 RX ORDER — IBUPROFEN 200 MG
16 TABLET ORAL
Status: DISCONTINUED | OUTPATIENT
Start: 2020-06-07 | End: 2020-06-08

## 2020-06-07 RX ADMIN — ASPIRIN 325 MG ORAL TABLET 325 MG: 325 PILL ORAL at 08:06

## 2020-06-07 RX ADMIN — FUROSEMIDE 40 MG: 10 INJECTION, SOLUTION INTRAMUSCULAR; INTRAVENOUS at 10:06

## 2020-06-07 RX ADMIN — METOPROLOL TARTRATE 12.5 MG: 25 TABLET, FILM COATED ORAL at 08:06

## 2020-06-07 RX ADMIN — ACETAMINOPHEN 1000 MG: 500 TABLET ORAL at 09:06

## 2020-06-07 RX ADMIN — METHOCARBAMOL TABLETS 500 MG: 500 TABLET, COATED ORAL at 08:06

## 2020-06-07 RX ADMIN — ACETAMINOPHEN 1000 MG: 500 TABLET ORAL at 12:06

## 2020-06-07 RX ADMIN — MUPIROCIN 1 G: 20 OINTMENT TOPICAL at 08:06

## 2020-06-07 RX ADMIN — PANTOPRAZOLE SODIUM 40 MG: 40 INJECTION, POWDER, LYOPHILIZED, FOR SOLUTION INTRAVENOUS at 08:06

## 2020-06-07 RX ADMIN — POTASSIUM CHLORIDE 20 MEQ: 1500 TABLET, EXTENDED RELEASE ORAL at 12:06

## 2020-06-07 RX ADMIN — OXYCODONE HYDROCHLORIDE 5 MG: 5 TABLET ORAL at 11:06

## 2020-06-07 RX ADMIN — ATORVASTATIN CALCIUM 80 MG: 20 TABLET, FILM COATED ORAL at 08:06

## 2020-06-07 RX ADMIN — OXYCODONE HYDROCHLORIDE 5 MG: 5 TABLET ORAL at 05:06

## 2020-06-07 RX ADMIN — METHOCARBAMOL TABLETS 500 MG: 500 TABLET, COATED ORAL at 12:06

## 2020-06-07 RX ADMIN — DOCUSATE SODIUM 100 MG: 100 CAPSULE, LIQUID FILLED ORAL at 08:06

## 2020-06-07 RX ADMIN — OXYCODONE HYDROCHLORIDE 5 MG: 5 TABLET ORAL at 12:06

## 2020-06-07 RX ADMIN — METHOCARBAMOL TABLETS 500 MG: 500 TABLET, COATED ORAL at 04:06

## 2020-06-07 RX ADMIN — METOPROLOL TARTRATE 25 MG: 25 TABLET, FILM COATED ORAL at 08:06

## 2020-06-07 RX ADMIN — OXYCODONE HYDROCHLORIDE 10 MG: 10 TABLET ORAL at 07:06

## 2020-06-07 NOTE — CARE UPDATE
BG goal 140-180  BG below goal ranges with IV insulin infusion off since yesterday afternoon. Diet progressed to clear liquids.    Discontinue transition IV insulin infusion  Start Low Dose Correction Scale  BG monitoring ac/hs    ** Please call Endocrine for any BG related issues **

## 2020-06-07 NOTE — PLAN OF CARE
Pt remained free of injuries, falls, and trauma. VSS. Pain being managed w/ PRN oxycodone q 4 hrs. Continuous Insulin gtt d/c'd. Glucose monitored q 4 hrs. No SSI needed. Pt being diuresed w/ Laisx IVP 40 mg BID. Strict intake and output being monitored. Pt has CTs hooked up to suction. Sternal precautions maintained. L leg donor site wrap w/ ace band. IS encouraged. Reinforced to call before getting up. Plan of care reviewed with pt. Pt verbalized understanding. All questions and concerns addressed. No new complaints mentioned at this time. Will continue to monitor.

## 2020-06-07 NOTE — PLAN OF CARE
Plan of care updated with patient and spouse. No falls during shift. No skin breakdown. Maintained fall protocol. Insulin gtt discontinued. PT/OT following pt. Two person assist.Pt stepped down from SICU today. Addressed all issues throughout shift.

## 2020-06-07 NOTE — ASSESSMENT & PLAN NOTE
Jeffery Melton is a 64 y.o. male s/p CABG x 3 for CAD on 06/06/2020     -starting scheduled tylenol 1g q8 hrs and schduled Robaxin for back pain  -PRN oxycodone  -on 3L, wean for sats > 88%  -will remove mediastinal chest tubes today, keep L pleural chest tube and pacing wires one more day  -Aspirin 325/statin  -increasing metoprolol to 25 BID  -good UOP on lasix 40 BID, keeping burns until tomorrow  -starting potassium 20 BID  -cardiac diet with 1500mL fluid restriction  -bowel regimen  -periop vanc  -ambulate QID  -home Tuesday

## 2020-06-07 NOTE — SUBJECTIVE & OBJECTIVE
Interval History/Significant Events:   Stepped down to CTSU uneventfully, VSS, afeb. Rough night due to back pain. Minimal chest tube output. On 3L. Great UOP. On cardiac diet.    Objective:     Vital Signs (Most Recent):  Temp: 98.6 °F (37 °C) (06/07/20 0726)  Pulse: 91 (06/07/20 1053)  Resp: 18 (06/07/20 0726)  BP: (!) 141/76 (06/07/20 0726)  SpO2: 96 % (06/07/20 0726) Vital Signs (24h Range):  Temp:  [98.3 °F (36.8 °C)-99.3 °F (37.4 °C)] 98.6 °F (37 °C)  Pulse:  [73-94] 91  Resp:  [18-33] 18  SpO2:  [92 %-98 %] 96 %  BP: (103-141)/(55-76) 141/76     Weight: 103 kg (227 lb 1.2 oz)  Body mass index is 31.67 kg/m².      Intake/Output Summary (Last 24 hours) at 6/7/2020 1136  Last data filed at 6/7/2020 0800  Gross per 24 hour   Intake 963 ml   Output 2425 ml   Net -1462 ml       Physical Exam   Constitutional: He appears well-developed and well-nourished.   HENT:   Head: Normocephalic and atraumatic.   Eyes: Right eye exhibits no discharge. Left eye exhibits no discharge. No scleral icterus.   Neck: No JVD present.   RIJ out   Cardiovascular: Normal rate and regular rhythm.   Pulmonary/Chest:   Sternotomy with dressing in place. Incision c/di.  Chest tubes x 3 in place connected to atrium. Pacer wires in place.   Abdominal: Soft. He exhibits no distension.   Colostomy in place in LLQ   Genitourinary:   Genitourinary Comments: Nunez in place   Musculoskeletal:   Left leg with ace bandage in place   Neurological:   sedated   Skin: Skin is warm and dry.   Vitals reviewed.    Lines/Drains/Airways     Drain                 Colostomy LLQ -- days         Urethral Catheter 06/05/20 0726 Straight-tip;Temperature probe 2 days         Y Chest Tube 1 and 2 06/05/20 1300 Right Mediastinal 19 Fr. Mediastinal 19 Fr. 1 day         Y Chest Tube 3 and 4 06/05/20 1300 Left Pleural 19 Fr. 1 day          Line                 Pacer Wires 06/05/20 2 days          Peripheral Intravenous Line                 Peripheral IV - Single Lumen  06/05/20 0545 20 G Left Hand 2 days         Peripheral IV - Single Lumen 06/06/20 1215 20 G Posterior;Right Hand less than 1 day                Significant Labs:    CBC/Anemia Profile:  Recent Labs   Lab 06/05/20  1825  06/06/20 0308 06/06/20  0503 06/07/20  0541   WBC 15.00*  --  7.51  --  7.60   HGB 10.4*  --  9.1*  --  9.4*   HCT 32.3*   < > 30.2* 25* 30.4*     --  183  --  188   MCV 89  --  92  --  89   RDW 14.2  --  14.4  --  14.8*    < > = values in this interval not displayed.        Chemistries:  Recent Labs   Lab 06/05/20  1415  06/06/20 0308  06/06/20  1930 06/07/20  0541 06/07/20  0801     --  139  --   --  138  --    K 4.5   < > 4.1   < > 3.9 3.8 4.1   *  --  109  --   --  104  --    CO2 23  --  22*  --   --  24  --    BUN 20  --  23  --   --  23  --    CREATININE 0.8  --  0.8  --   --  0.8  --    CALCIUM 8.6*  --  8.2*  --   --  8.6*  --    MG 2.6  2.6  --  2.1  --   --  2.0  --    PHOS 3.7  3.7  --  2.7  --   --  1.8*  --     < > = values in this interval not displayed.       Significant Imaging:  I have reviewed and interpreted all pertinent imaging results/findings within the past 24 hours.

## 2020-06-07 NOTE — NURSING TRANSFER
Nursing Transfer Note      6/7/2020     Transfer From: SICU    Transfer via bed    Transfer with 2 L/min to O2, cardiac monitoring    Transported by RN and PCT    Medicines sent: Mupirocin    Chart send with patient: Yes    Notified: spouse at bedside    Patient reassessed at: 6/6/2020, 16:25 (date, time)    Upon arrival to floor: cardiac monitor applied, patient oriented to room, call bell in reach and bed in lowest position

## 2020-06-07 NOTE — PLAN OF CARE
Ph[facility]acy: [facility] [address]   [phone]    PCP- Ward Piña, ANNE MARIE  [address]  [phone]    Payor: [facility] [employer]   Primary Caregiver: Wife Татьяна Melton      SW contacted pt wife, Ms. Melton to completed d/c assessment. Wife verified information in pt file and named self as pt primary care giver in the home. Wife stated that pt has no DME, is not on dialysis and does not have coumadin blood draws. Pt's daughter is able to provide transport upon pt d/c. Wife requested that pt nurse have pt contact her, as pt room phone is not currently receiving call. RIDGE sent SC to pt nurse to assist pt with placing call to wife.    NO further questions at the time of call.        06/07/20 1236   Discharge Assessment   Assessment Type Discharge Planning Assessment   Confirmed/corrected address and phone number on facesheet? Yes   Assessment information obtained from? Caregiver   Communicated expected length of stay with patient/caregiver no   Prior to hospitilization cognitive status: Alert/Oriented   Prior to hospitalization functional status: Independent   Current cognitive status: Alert/Oriented   Current Functional Status: Independent   Facility Arrived From: home   Lives With spouse   Able to Return to Prior Arrangements yes   Is patient able to care for self after discharge? Unable to determine at this time (comments)   Who are your caregiver(s) and their phone number(s)? Wife    Readmission Within the Last 30 Days unable to assess   Patient currently being followed by outpatient case management? No   Patient currently receives any other outside agency services? No   Do you have any problems affording any of your prescribed medications? No   Is the patient taking medications as prescribed? yes   Does the patient have transportation home? Yes   Transportation Anticipated car, drives self   Dialysis Name and Scheduled days n/a   Does the patient receive services at the [facility]madin Clinic? No    Discharge Plan A Home with family     Allyn White LMSW  Case Management Social Worker   [facility]       movement

## 2020-06-07 NOTE — PLAN OF CARE
Plan of care discussed with patient.  Patient ambulating with assistance x1-2, fall precautions in place. Continuing to encourage sternal precautions, IS, and ambulation. Patient pain controled by PRN and scheduled medicaitons. Added scheduled tylenol and muscle relaxer to better control pain. Discussed medications and care. Being diuresed with 40 mg lasix BID, electrolytes replaced. L Pleural CT in place, both Meds CT's pulled this afternoon. Pacer wires isolated and secured to dressing. Nunez removed along with mid sternal dressing and L leg donor site. BS taken ACHS. Patient has no questions at this time. Will continue to monitor.

## 2020-06-07 NOTE — NURSING
Nunez removed per protocol, pt tolerated well. Educated about importance of urinating within 6 hours of removal. Urinals within pts reach. Pt due to void at 2015. Will continue to monitor.

## 2020-06-08 PROBLEM — D64.89 OTHER SPECIFIED ANEMIAS: Status: ACTIVE | Noted: 2020-06-08

## 2020-06-08 PROBLEM — E83.39 HYPOPHOSPHATEMIA: Status: ACTIVE | Noted: 2020-06-08

## 2020-06-08 LAB
ANION GAP SERPL CALC-SCNC: 9 MMOL/L (ref 8–16)
ANION GAP SERPL CALC-SCNC: 9 MMOL/L (ref 8–16)
ANISOCYTOSIS BLD QL SMEAR: SLIGHT
APTT BLDCRRT: 30.2 SEC (ref 21–32)
BASOPHILS # BLD AUTO: 0.01 K/UL (ref 0–0.2)
BASOPHILS NFR BLD: 0.1 % (ref 0–1.9)
BUN SERPL-MCNC: 27 MG/DL (ref 8–23)
BUN SERPL-MCNC: 29 MG/DL (ref 8–23)
CALCIUM SERPL-MCNC: 8.7 MG/DL (ref 8.7–10.5)
CALCIUM SERPL-MCNC: 8.9 MG/DL (ref 8.7–10.5)
CHLORIDE SERPL-SCNC: 100 MMOL/L (ref 95–110)
CHLORIDE SERPL-SCNC: 102 MMOL/L (ref 95–110)
CO2 SERPL-SCNC: 28 MMOL/L (ref 23–29)
CO2 SERPL-SCNC: 30 MMOL/L (ref 23–29)
CREAT SERPL-MCNC: 0.8 MG/DL (ref 0.5–1.4)
CREAT SERPL-MCNC: 0.9 MG/DL (ref 0.5–1.4)
DIFFERENTIAL METHOD: ABNORMAL
EOSINOPHIL # BLD AUTO: 0.3 K/UL (ref 0–0.5)
EOSINOPHIL NFR BLD: 3.5 % (ref 0–8)
ERYTHROCYTE [DISTWIDTH] IN BLOOD BY AUTOMATED COUNT: 14.7 % (ref 11.5–14.5)
EST. GFR  (AFRICAN AMERICAN): >60 ML/MIN/1.73 M^2
EST. GFR  (AFRICAN AMERICAN): >60 ML/MIN/1.73 M^2
EST. GFR  (NON AFRICAN AMERICAN): >60 ML/MIN/1.73 M^2
EST. GFR  (NON AFRICAN AMERICAN): >60 ML/MIN/1.73 M^2
GLUCOSE SERPL-MCNC: 107 MG/DL (ref 70–110)
GLUCOSE SERPL-MCNC: 112 MG/DL (ref 70–110)
HCT VFR BLD AUTO: 32.6 % (ref 40–54)
HGB BLD-MCNC: 9.9 G/DL (ref 14–18)
IMM GRANULOCYTES # BLD AUTO: 0.04 K/UL (ref 0–0.04)
IMM GRANULOCYTES NFR BLD AUTO: 0.5 % (ref 0–0.5)
INR PPP: 1 (ref 0.8–1.2)
LYMPHOCYTES # BLD AUTO: 0.7 K/UL (ref 1–4.8)
LYMPHOCYTES NFR BLD: 9.3 % (ref 18–48)
MAGNESIUM SERPL-MCNC: 2.2 MG/DL (ref 1.6–2.6)
MAGNESIUM SERPL-MCNC: 2.3 MG/DL (ref 1.6–2.6)
MCH RBC QN AUTO: 27.6 PG (ref 27–31)
MCHC RBC AUTO-ENTMCNC: 30.4 G/DL (ref 32–36)
MCV RBC AUTO: 91 FL (ref 82–98)
MONOCYTES # BLD AUTO: 1.1 K/UL (ref 0.3–1)
MONOCYTES NFR BLD: 13.6 % (ref 4–15)
NEUTROPHILS # BLD AUTO: 5.7 K/UL (ref 1.8–7.7)
NEUTROPHILS NFR BLD: 73 % (ref 38–73)
NRBC BLD-RTO: 0 /100 WBC
OVALOCYTES BLD QL SMEAR: ABNORMAL
PHOSPHATE SERPL-MCNC: 2 MG/DL (ref 2.7–4.5)
PLATELET # BLD AUTO: 252 K/UL (ref 150–350)
PLATELET BLD QL SMEAR: ABNORMAL
PMV BLD AUTO: 10.1 FL (ref 9.2–12.9)
POIKILOCYTOSIS BLD QL SMEAR: SLIGHT
POLYCHROMASIA BLD QL SMEAR: ABNORMAL
POTASSIUM SERPL-SCNC: 3.7 MMOL/L (ref 3.5–5.1)
POTASSIUM SERPL-SCNC: 4.1 MMOL/L (ref 3.5–5.1)
PROTHROMBIN TIME: 10.6 SEC (ref 9–12.5)
RBC # BLD AUTO: 3.59 M/UL (ref 4.6–6.2)
SODIUM SERPL-SCNC: 139 MMOL/L (ref 136–145)
SODIUM SERPL-SCNC: 139 MMOL/L (ref 136–145)
TARGETS BLD QL SMEAR: ABNORMAL
WBC # BLD AUTO: 7.78 K/UL (ref 3.9–12.7)

## 2020-06-08 PROCEDURE — 25000003 PHARM REV CODE 250: Performed by: STUDENT IN AN ORGANIZED HEALTH CARE EDUCATION/TRAINING PROGRAM

## 2020-06-08 PROCEDURE — 83735 ASSAY OF MAGNESIUM: CPT | Mod: 91

## 2020-06-08 PROCEDURE — 63600175 PHARM REV CODE 636 W HCPCS: Performed by: STUDENT IN AN ORGANIZED HEALTH CARE EDUCATION/TRAINING PROGRAM

## 2020-06-08 PROCEDURE — 25000003 PHARM REV CODE 250: Performed by: NURSE PRACTITIONER

## 2020-06-08 PROCEDURE — 83735 ASSAY OF MAGNESIUM: CPT

## 2020-06-08 PROCEDURE — 85730 THROMBOPLASTIN TIME PARTIAL: CPT

## 2020-06-08 PROCEDURE — 97116 GAIT TRAINING THERAPY: CPT

## 2020-06-08 PROCEDURE — 93010 EKG 12-LEAD: ICD-10-PCS | Mod: ,,, | Performed by: INTERNAL MEDICINE

## 2020-06-08 PROCEDURE — 85025 COMPLETE CBC W/AUTO DIFF WBC: CPT

## 2020-06-08 PROCEDURE — 84100 ASSAY OF PHOSPHORUS: CPT

## 2020-06-08 PROCEDURE — 85610 PROTHROMBIN TIME: CPT

## 2020-06-08 PROCEDURE — 93005 ELECTROCARDIOGRAM TRACING: CPT

## 2020-06-08 PROCEDURE — 20600001 HC STEP DOWN PRIVATE ROOM

## 2020-06-08 PROCEDURE — 36415 COLL VENOUS BLD VENIPUNCTURE: CPT

## 2020-06-08 PROCEDURE — 80048 BASIC METABOLIC PNL TOTAL CA: CPT

## 2020-06-08 PROCEDURE — 80048 BASIC METABOLIC PNL TOTAL CA: CPT | Mod: 91

## 2020-06-08 PROCEDURE — 93010 ELECTROCARDIOGRAM REPORT: CPT | Mod: ,,, | Performed by: INTERNAL MEDICINE

## 2020-06-08 RX ORDER — POTASSIUM CHLORIDE 20 MEQ/1
20 TABLET, EXTENDED RELEASE ORAL EVERY 12 HOURS
Status: DISCONTINUED | OUTPATIENT
Start: 2020-06-08 | End: 2020-06-10 | Stop reason: HOSPADM

## 2020-06-08 RX ORDER — METOPROLOL TARTRATE 1 MG/ML
5 INJECTION, SOLUTION INTRAVENOUS EVERY 5 MIN PRN
Status: COMPLETED | OUTPATIENT
Start: 2020-06-08 | End: 2020-06-08

## 2020-06-08 RX ORDER — ATORVASTATIN CALCIUM 20 MG/1
40 TABLET, FILM COATED ORAL NIGHTLY
Status: DISCONTINUED | OUTPATIENT
Start: 2020-06-08 | End: 2020-06-08

## 2020-06-08 RX ORDER — PANTOPRAZOLE SODIUM 40 MG/1
40 TABLET, DELAYED RELEASE ORAL
Status: DISCONTINUED | OUTPATIENT
Start: 2020-06-08 | End: 2020-06-10 | Stop reason: HOSPADM

## 2020-06-08 RX ORDER — METOPROLOL TARTRATE 1 MG/ML
INJECTION, SOLUTION INTRAVENOUS
Status: DISPENSED
Start: 2020-06-08 | End: 2020-06-09

## 2020-06-08 RX ORDER — ASPIRIN 325 MG
325 TABLET, DELAYED RELEASE (ENTERIC COATED) ORAL DAILY
Status: DISCONTINUED | OUTPATIENT
Start: 2020-06-08 | End: 2020-06-10 | Stop reason: HOSPADM

## 2020-06-08 RX ORDER — METOPROLOL TARTRATE 1 MG/ML
5 INJECTION, SOLUTION INTRAVENOUS EVERY 5 MIN PRN
Status: DISCONTINUED | OUTPATIENT
Start: 2020-06-09 | End: 2020-06-10 | Stop reason: HOSPADM

## 2020-06-08 RX ADMIN — METHOCARBAMOL TABLETS 500 MG: 500 TABLET, COATED ORAL at 08:06

## 2020-06-08 RX ADMIN — ACETAMINOPHEN 1000 MG: 500 TABLET ORAL at 01:06

## 2020-06-08 RX ADMIN — METOPROLOL TARTRATE 25 MG: 25 TABLET, FILM COATED ORAL at 09:06

## 2020-06-08 RX ADMIN — OXYCODONE HYDROCHLORIDE 10 MG: 10 TABLET ORAL at 10:06

## 2020-06-08 RX ADMIN — POTASSIUM CHLORIDE 20 MEQ: 1500 TABLET, EXTENDED RELEASE ORAL at 08:06

## 2020-06-08 RX ADMIN — OXYCODONE HYDROCHLORIDE 5 MG: 5 TABLET ORAL at 01:06

## 2020-06-08 RX ADMIN — METOPROLOL TARTRATE 5 MG: 5 INJECTION INTRAVENOUS at 11:06

## 2020-06-08 RX ADMIN — OXYCODONE HYDROCHLORIDE 10 MG: 10 TABLET ORAL at 08:06

## 2020-06-08 RX ADMIN — ACETAMINOPHEN 1000 MG: 500 TABLET ORAL at 06:06

## 2020-06-08 RX ADMIN — METHOCARBAMOL TABLETS 500 MG: 500 TABLET, COATED ORAL at 09:06

## 2020-06-08 RX ADMIN — MUPIROCIN 1 G: 20 OINTMENT TOPICAL at 08:06

## 2020-06-08 RX ADMIN — OXYCODONE HYDROCHLORIDE 5 MG: 5 TABLET ORAL at 03:06

## 2020-06-08 RX ADMIN — MUPIROCIN 1 G: 20 OINTMENT TOPICAL at 09:06

## 2020-06-08 RX ADMIN — DIBASIC SODIUM PHOSPHATE, MONOBASIC POTASSIUM PHOSPHATE AND MONOBASIC SODIUM PHOSPHATE 1 TABLET: 852; 155; 130 TABLET ORAL at 01:06

## 2020-06-08 RX ADMIN — ASPIRIN 325 MG: 325 TABLET, COATED ORAL at 08:06

## 2020-06-08 RX ADMIN — METHOCARBAMOL TABLETS 500 MG: 500 TABLET, COATED ORAL at 05:06

## 2020-06-08 RX ADMIN — FUROSEMIDE 40 MG: 10 INJECTION, SOLUTION INTRAMUSCULAR; INTRAVENOUS at 08:06

## 2020-06-08 RX ADMIN — METOPROLOL TARTRATE 25 MG: 25 TABLET, FILM COATED ORAL at 08:06

## 2020-06-08 RX ADMIN — ATORVASTATIN CALCIUM 80 MG: 20 TABLET, FILM COATED ORAL at 08:06

## 2020-06-08 RX ADMIN — POTASSIUM CHLORIDE 20 MEQ: 1500 TABLET, EXTENDED RELEASE ORAL at 09:06

## 2020-06-08 RX ADMIN — FUROSEMIDE 40 MG: 10 INJECTION, SOLUTION INTRAMUSCULAR; INTRAVENOUS at 09:06

## 2020-06-08 RX ADMIN — PANTOPRAZOLE SODIUM 40 MG: 40 TABLET, DELAYED RELEASE ORAL at 08:06

## 2020-06-08 RX ADMIN — METHOCARBAMOL TABLETS 500 MG: 500 TABLET, COATED ORAL at 01:06

## 2020-06-08 RX ADMIN — OXYCODONE HYDROCHLORIDE 10 MG: 10 TABLET ORAL at 06:06

## 2020-06-08 RX ADMIN — ACETAMINOPHEN 1000 MG: 500 TABLET ORAL at 09:06

## 2020-06-08 NOTE — PLAN OF CARE
Patient tolerated PT session well with daughter present for education. He ambulated 200ft with supervision and no AD. No LOB or SOB noted.     Problem: Physical Therapy Goal  Goal: Physical Therapy Goal  Description  Goals to be met by: 2020    Patient will increase functional independence with mobility by performin. Supine to sit with Contact Guard Assistance - not met  2. Sit to stand transfer with Supervision - not met  3. Gait  x 150 feet with Supervision - not met  4. Ascend/descend 6 stair with bilateral Handrails Contact Guard Assistance - not met     Outcome: Ongoing, Progressing

## 2020-06-08 NOTE — ASSESSMENT & PLAN NOTE
-Phosphrous labs daily; today 2.0  -Will replace today and repeat lab in the AM  -Continue to monitor trends

## 2020-06-08 NOTE — PT/OT/SLP PROGRESS
Occupational Therapy      Patient Name:  Jeffery Melton   MRN:  [id number]    Pt not seen on this date; Chart reviewed and pt remain appropriate for OT services at this time.  Will see pt as schedule allows. Pt is on track to meet plan of care.       Daniel Duvall, OT  6/8/2020

## 2020-06-08 NOTE — PLAN OF CARE
06/08/20 1412   Discharge Reassessment   Assessment Type Discharge Planning Reassessment   Provided patient/caregiver education on the expected discharge date and the discharge plan Yes   Do you have any problems affording any of your prescribed medications? No   Discharge Plan A Home Health   Discharge Plan B Home Health   DME Needed Upon Discharge  none   Anticipated Discharge Disposition Home-Health   Can the patient/caregiver answer the patient profile reliably? Yes, cognitively intact     CM to bedside for dc planning assessment. Pt is s/p CABG POD 3. Anticipate d/c home with home health.Pt will be discharging to daughter's house in [place]. Address is:    [address] [place] [address] Dr Henry CARMICHAEL    He does not have a preference for .    Julie Haase RN  Case Management [phone]

## 2020-06-08 NOTE — PROGRESS NOTES
[facility]  Cardiothoracic Surgery  Progress Note    Patient Name: Jeffery Melton  MRN: [id number]  Admission Date: 6/5/2020  Hospital Length of Stay: 3 days  Code Status: Prior   Attending Physician: Anuj Ceron MD   Referring Provider: Anuj Ceron MD  Principal Problem:S/P CABG (coronary artery bypass graft)    Subjective:     Post-Op Info:  Procedure(s) (LRB):  CORONARY ARTERY BYPASS GRAFT (CABG) x 3  (N/A)   3 Days Post-Op     Interval History: NAEON. NSR on monitor with chest tubes and pacers wires in place, continue PT/OT today. Possible discharge tomorrow.  Currently weaned off of oxygen and monitoring per nurse.    Review of Systems   Constitution: Negative for decreased appetite, fever and malaise/fatigue.   Cardiovascular: Negative for chest pain, claudication, dyspnea on exertion, irregular heartbeat, leg swelling, palpitations and syncope.   Respiratory: Negative for cough and shortness of breath.    Hematologic/Lymphatic: Negative for bleeding problem.   Skin: Negative for rash.   Musculoskeletal: Negative for arthritis and myalgias.   Gastrointestinal: Negative for abdominal pain, diarrhea, melena, nausea and vomiting.   Genitourinary: Negative for dysuria.   Neurological: Negative for headaches, paresthesias and seizures.     Medications:  Continuous Infusions:  Scheduled Meds:   acetaminophen  1,000 mg Oral Q8H    aspirin  325 mg Oral Daily    atorvastatin  40 mg Oral QHS    atorvastatin  80 mg Oral Daily    docusate sodium  100 mg Oral BID    furosemide (LASIX) IV  40 mg Intravenous Q12H    methocarbamoL  500 mg Oral QID    metoprolol tartrate  25 mg Oral BID    mupirocin  1 g Nasal BID    pantoprazole  40 mg Oral Before breakfast    polyethylene glycol  17 g Oral Daily    potassium chloride  20 mEq Oral Q12H     PRN Meds:bisacodyL, Dextrose 10% Bolus, Dextrose 10% Bolus, Dextrose 10% Bolus, Dextrose 10% Bolus, glucagon (human recombinant), glucose,  glucose, insulin aspart U-100, metoclopramide HCl, ondansetron, oxyCODONE, oxyCODONE, sodium chloride 0.9%     Objective:     Vital Signs (Most Recent):  Temp: 97.9 °F (36.6 °C) (06/08/20 0840)  Pulse: 88 (06/08/20 0840)  Resp: 20 (06/08/20 0840)  BP: 114/69 (06/08/20 0840)  SpO2: (!) 94 % (06/08/20 0842) Vital Signs (24h Range):  Temp:  [97 °F (36.1 °C)-99 °F (37.2 °C)] 97.9 °F (36.6 °C)  Pulse:  [53-94] 88  Resp:  [17-20] 20  SpO2:  [94 %-99 %] 94 %  BP: (114-165)/(61-87) 114/69     Weight: 100.7 kg (222 lb 0.1 oz)  Body mass index is 30.96 kg/m².    SpO2: (!) 94 %  O2 Device (Oxygen Therapy): nasal cannula w/ humidification    Intake/Output - Last 3 Shifts       06/06 0700 - 06/07 0659 06/07 0700 - 06/08 0659 06/08 0700 - 06/09 0659    P.O. 720 939     I.V. (mL/kg)       Blood       Total Intake(mL/kg) 720 (7) 939 (9.1)     Urine (mL/kg/hr) 2535 (1) 1725 (0.7)     Emesis/NG output  5     Stool 0 0     Chest Tube 130 60     Total Output 2665 1790     Net -1945 -851            Stool Occurrence 0 x 0 x           Lines/Drains/Airways     Drain                 Colostomy LLQ -- days         Y Chest Tube 3 and 4 06/05/20 1300 Left Pleural 19 Fr. 2 days          Line                 Pacer Wires 06/05/20 3 days          Peripheral Intravenous Line                 Peripheral IV - Single Lumen 06/05/20 0545 20 G Left Hand 3 days         Peripheral IV - Single Lumen 06/06/20 1215 20 G Posterior;Right Hand 1 day                Physical Exam   Constitutional: He is oriented to person, place, and time. He appears well-developed and well-nourished.   Cardiovascular: Normal rate, regular rhythm and normal heart sounds.   Pulmonary/Chest: Effort normal and breath sounds normal.   Abdominal: Soft.   Colostomy present   Musculoskeletal: Normal range of motion.   Neurological: He is alert and oriented to person, place, and time.   Skin: Skin is warm, dry and intact.   Sternal Incision CDI; chest tube and pacers wires in place    Psychiatric: He has a normal mood and affect.       Significant Labs:  BMP:   Recent Labs   Lab 06/08/20  0446         K 4.1      CO2 28   BUN 27*   CREATININE 0.8   CALCIUM 8.9   MG 2.3     CBC:   Recent Labs   Lab 06/08/20  0446   WBC 7.78   RBC 3.59*   HGB 9.9*   HCT 32.6*      MCV 91   MCH 27.6   MCHC 30.4*     CMP:   Recent Labs   Lab 06/08/20  0446      CALCIUM 8.9      K 4.1   CO2 28      BUN 27*   CREATININE 0.8     Coagulation:   Recent Labs   Lab 06/08/20  0446   INR 1.0   APTT 30.2       Significant Diagnostics:  I have reviewed all pertinent imaging results/findings within the past 24 hours.    Assessment/Plan:     * S/P CABG (coronary artery bypass graft)  Jeffery Melton is a 64 y.o. male s/p CABG x 3 for CAD on 06/06/2020  -starting scheduled tylenol 1g q8 hrs and schduled Robaxin for back pain  -PRN oxycodone  -on 3L, wean for sats > 88%  -will remove chest tube and pacer wires today  -Aspirin 325/statin  -increasing metoprolol to 25 BID  -good UOP on lasix 40 BID, keeping burns until tomorrow  -starting potassium 20 BID  -cardiac diet with 1500mL fluid restriction  -bowel regimen  -periop vanc  -ambulate QID    DISPO: Possible discharge home tomorrow    Other specified anemias  -Expected postoperative blood loss secondary to CABG  -CBC daily to monitor trends    Hypophosphatemia  -Phosphrous labs daily; today 2.0  -Will replace today and repeat lab in the AM  -Continue to monitor trends    Hyperglycemia  -Endocrine following        Brook Solitario NP  Cardiothoracic Surgery  [facility]

## 2020-06-08 NOTE — ASSESSMENT & PLAN NOTE
Jeffery Melton is a 64 y.o. male s/p CABG x 3 for CAD on 06/06/2020  -starting scheduled tylenol 1g q8 hrs and schduled Robaxin for back pain  -PRN oxycodone  -on 3L, wean for sats > 88%  -will remove chest tube and pacer wires today  -Aspirin 325/statin  -increasing metoprolol to 25 BID  -good UOP on lasix 40 BID, keeping burns until tomorrow  -starting potassium 20 BID  -cardiac diet with 1500mL fluid restriction  -bowel regimen  -periop vanc  -ambulate QID    DISPO: Possible discharge home tomorrow

## 2020-06-08 NOTE — PLAN OF CARE
Pt free of falls and injury. Fall precautions remain in place. Sternal precautions remain in place. Pt should attempt to ambulate more today. IS attempted by pt with education by RN. Pt would benefit from more IS. BG monitored AC/HS. Pt remains on O2 2L nasal cannula. NSR on telemetry. Pleural CT remains in place. Colostomy in place. Diuresing with IVP Lasix 40mg bid. Educated pt on importance of accurate I/Os. Pt voiding spontaneously per urinal. Plan of care reviewed with pt. Managing pain with scheduled tylenol and PRN oxycodone. Pt VSS, no distress, will continue to monitor.

## 2020-06-08 NOTE — PLAN OF CARE
Pt free of falls/trauma/injuries.  Denies c/o SOB; O2Sats remain stable on RA.  Incentive spirometry encouraged throughout shift.  Incisional pain managed with PO analgesics.  Generalized skin remains CDI; trace edema noted to BLEs.  TEDs ordered.  Last CT and V-wires removed this shift.  Incisions remain CDI.  Pt being diuresed with Lasix 40mg IVP BID; diuresing well.   Wt remains stable.  Electrolytes replaced as ordered.  PT/OT following; pt able to ambulate in hallway with standby assist.  Plan to continue with post-op care, with expected discharge tomorrow.  Wife at the bedside.  Pt and family tolerating plan of care.

## 2020-06-08 NOTE — PT/OT/SLP PROGRESS
Physical Therapy Treatment    Patient Name:  Jeffery Melton   MRN:  [id number]    Recommendations:     Discharge Recommendations:  home   Discharge Equipment Recommendations: none   Barriers to discharge: None    Assessment:     Jeffery Melton is a 64 y.o. male admitted with a medical diagnosis of S/P CABG (coronary artery bypass graft).  He presents with the following impairments/functional limitations:  impaired endurance, impaired functional mobilty, gait instability, impaired balance, impaired cardiopulmonary response to activity. Patient tolerated PT session well with daughter present for education. He ambulated 200ft with supervision and no AD. No LOB or SOB noted.     Rehab Prognosis: Good; patient would benefit from acute skilled PT services to address these deficits and reach maximum level of function.    Recent Surgery: Procedure(s) (LRB):  CORONARY ARTERY BYPASS GRAFT (CABG) x 3  (N/A) 3 Days Post-Op    Plan:     During this hospitalization, patient to be seen 5 x/week to address the identified rehab impairments via gait training, therapeutic activities, therapeutic exercises, neuromuscular re-education and progress toward the following goals:    · Plan of Care Expires:  07/05/20    Subjective     Subjective: Went for a walk with the nurse this morning.    Patient/Family Comments/goals: To get back to PLOF.   Pain/Comfort:  · Pain Rating 1: 0/10      Objective:     Communicated with RN prior to session.  Patient found up in chair with telemetry, colostomy, peripheral IV upon PT entry to room.     General Precautions: Standard, fall, sternal   Orthopedic Precautions:N/A   Braces: N/A     Functional Mobility:  · Transfers:     · Sit to Stand:  supervision with no AD  · Gait:  Patient ambulated 200ft with supervision and no AD. No LOB or SOB noted.     AM-PAC 6 CLICK MOBILITY  Turning over in bed (including adjusting bedclothes, sheets and blankets)?: 4  Sitting down on and standing up from a chair with arms (e.g.,  wheelchair, bedside commode, etc.): 4  Moving from lying on back to sitting on the side of the bed?: 4  Moving to and from a bed to a chair (including a wheelchair)?: 4  Need to walk in hospital room?: 4  Climbing 3-5 steps with a railing?: 3  Basic Mobility Total Score: 23       Therapeutic Activities and Exercises:  Patient and daughter educated in:  -PT role and POC  -safety with transfers including not using hands for sit to stand  -OOB activity to maximize recovery including ambulating 3x's/day   -sternal precautions       Patient left up in chair with all lines intact, call button in reach, RN notified and daughter present.    GOALS:   Multidisciplinary Problems     Physical Therapy Goals        Problem: Physical Therapy Goal    Goal Priority Disciplines Outcome Goal Variances Interventions   Physical Therapy Goal     PT, PT/OT Ongoing, Progressing     Description:  Goals to be met by: 2020    Patient will increase functional independence with mobility by performin. Supine to sit with Contact Guard Assistance - not met  2. Sit to stand transfer with Supervision - not met  3. Gait  x 150 feet with Supervision - not met  4. Ascend/descend 6 stair with bilateral Handrails Contact Guard Assistance - not met                      Time Tracking:     PT Received On: 20  PT Start Time: 1315     PT Stop Time: 1330  PT Total Time (min): 15 min     Billable Minutes: Gait Training  15    Treatment Type: Treatment  PT/PTA: PT       Anat Arias, PT  2020

## 2020-06-08 NOTE — CARE UPDATE
BG goal 110-140  BG below goal ranges with no insulin requirements. No hx of DM.  Tolerating cardiac diet.     Recommend discontinuing low dose correction scale and BG monitoring.  Recommend continuing to monitor BG with am labs.    Endocrine will sign off at this time. Please reconsult if needed.

## 2020-06-08 NOTE — HPI
Mr. Melton is a 64 year old man who initially presented with dyspnea on exertion and chest heaviness.  He had an angiogram in November of 2019 which demonstrated multivessel disease.  He was told at that time that percutaneous intervention was not an option.  He sought a 2nd opinion here at [facility], and surgical revascularization was recommended.  He now presents for coronary artery bypass grafting.

## 2020-06-08 NOTE — HOSPITAL COURSE
On 6/5/2020 the patient was taken to the Operating Room for the above stated procedure. Please see the previously dictated operative report for complete details. Postoperatively, the patient was taken from the  Operating Room to the ICU where the vital signs were monitored and pain was kept under control. The patient was weaned from the drips and extubated in the ICU per protocol. Once hemodynamically stable, the patient was transferred to the Cardiac Step-Down floor for continued strengthening and ambulation. On postoperative day 5, the patient was ready for discharge to home. At the time of discharge, the patient was ambulating unassisted. Pain was well controlled with oral analgesics and the patient was tolerating the diet.  Post operative course was complicated by atrial fibrillation with RVR.  Patient received amiodarone infusion for 24 hours and transitioned to oral amiodarone.  Patient will be discharged home on amiodarone, eliquis, beta blocker, statin and ASA.      MOBILITY AND ACTIVITY: As tolerated. Patient may shower. No heavy lifting of greater than 5 pounds and no driving.     DIET: An 1800-calorie ADA with a 1500 mL fluid restriction.     WOUND CARE INSTRUCTIONS: Check for redness, swelling and drainage around the  incision or wound. Patient is to call for any obvious bleeding, drainage, pus from the wound, unusual problems or difficulties or temperature of greater than 101   degrees.     FOLLOWUP: Follow up with  in approximately 3 weeks. Prior to this  appointment, the patient will have a chest x-ray and EKG.     Patient not placed on Ace-Inhibitor at the time of discharge due to potential for hypotension     DISCHARGE CONDITION: At the time of discharge, the patient was in sinus rhythm and afebrile with stable vital signs.    Discharge instructions were given to the patient and his family.  All parties state a verbal understand of instructions given. Patient was provided with CTS contact  information for any questions or concerns once discharged.

## 2020-06-08 NOTE — SUBJECTIVE & OBJECTIVE
Interval History: NAEON. NSR on monitor with chest tubes and pacers wires in place, continue PT/OT today. Possible discharge tomorrow.    Review of Systems   Constitution: Negative for decreased appetite, fever and malaise/fatigue.   Cardiovascular: Negative for chest pain, claudication, dyspnea on exertion, irregular heartbeat, leg swelling, palpitations and syncope.   Respiratory: Negative for cough and shortness of breath.    Hematologic/Lymphatic: Negative for bleeding problem.   Skin: Negative for rash.   Musculoskeletal: Negative for arthritis and myalgias.   Gastrointestinal: Negative for abdominal pain, diarrhea, melena, nausea and vomiting.   Genitourinary: Negative for dysuria.   Neurological: Negative for headaches, paresthesias and seizures.     Medications:  Continuous Infusions:  Scheduled Meds:   acetaminophen  1,000 mg Oral Q8H    aspirin  325 mg Oral Daily    atorvastatin  40 mg Oral QHS    atorvastatin  80 mg Oral Daily    docusate sodium  100 mg Oral BID    furosemide (LASIX) IV  40 mg Intravenous Q12H    methocarbamoL  500 mg Oral QID    metoprolol tartrate  25 mg Oral BID    mupirocin  1 g Nasal BID    pantoprazole  40 mg Oral Before breakfast    polyethylene glycol  17 g Oral Daily    potassium chloride  20 mEq Oral Q12H     PRN Meds:bisacodyL, Dextrose 10% Bolus, Dextrose 10% Bolus, Dextrose 10% Bolus, Dextrose 10% Bolus, glucagon (human recombinant), glucose, glucose, insulin aspart U-100, metoclopramide HCl, ondansetron, oxyCODONE, oxyCODONE, sodium chloride 0.9%     Objective:     Vital Signs (Most Recent):  Temp: 97.9 °F (36.6 °C) (06/08/20 0840)  Pulse: 88 (06/08/20 0840)  Resp: 20 (06/08/20 0840)  BP: 114/69 (06/08/20 0840)  SpO2: (!) 94 % (06/08/20 0842) Vital Signs (24h Range):  Temp:  [97 °F (36.1 °C)-99 °F (37.2 °C)] 97.9 °F (36.6 °C)  Pulse:  [53-94] 88  Resp:  [17-20] 20  SpO2:  [94 %-99 %] 94 %  BP: (114-165)/(61-87) 114/69     Weight: 100.7 kg (222 lb 0.1 oz)  Body  mass index is 30.96 kg/m².    SpO2: (!) 94 %  O2 Device (Oxygen Therapy): nasal cannula w/ humidification    Intake/Output - Last 3 Shifts       06/06 0700 - 06/07 0659 06/07 0700 - 06/08 0659 06/08 0700 - 06/09 0659    P.O. 720 939     I.V. (mL/kg)       Blood       Total Intake(mL/kg) 720 (7) 939 (9.1)     Urine (mL/kg/hr) 2535 (1) 1725 (0.7)     Emesis/NG output  5     Stool 0 0     Chest Tube 130 60     Total Output 2665 1790     Net -1945 -851            Stool Occurrence 0 x 0 x           Lines/Drains/Airways     Drain                 Colostomy LLQ -- days         Y Chest Tube 3 and 4 06/05/20 1300 Left Pleural 19 Fr. 2 days          Line                 Pacer Wires 06/05/20 3 days          Peripheral Intravenous Line                 Peripheral IV - Single Lumen 06/05/20 0545 20 G Left Hand 3 days         Peripheral IV - Single Lumen 06/06/20 1215 20 G Posterior;Right Hand 1 day                Physical Exam   Constitutional: He is oriented to person, place, and time. He appears well-developed and well-nourished.   Cardiovascular: Normal rate, regular rhythm and normal heart sounds.   Pulmonary/Chest: Effort normal and breath sounds normal.   Abdominal: Soft.   Colostomy present   Musculoskeletal: Normal range of motion.   Neurological: He is alert and oriented to person, place, and time.   Skin: Skin is warm, dry and intact.   Sternal Incision CDI; chest tube and pacers wires in place   Psychiatric: He has a normal mood and affect.       Significant Labs:  BMP:   Recent Labs   Lab 06/08/20 0446         K 4.1      CO2 28   BUN 27*   CREATININE 0.8   CALCIUM 8.9   MG 2.3     CBC:   Recent Labs   Lab 06/08/20 0446   WBC 7.78   RBC 3.59*   HGB 9.9*   HCT 32.6*      MCV 91   MCH 27.6   MCHC 30.4*     CMP:   Recent Labs   Lab 06/08/20 0446      CALCIUM 8.9      K 4.1   CO2 28      BUN 27*   CREATININE 0.8     Coagulation:   Recent Labs   Lab 06/08/20 0446   INR 1.0    APTT 30.2       Significant Diagnostics:  I have reviewed all pertinent imaging results/findings within the past 24 hours.

## 2020-06-09 ENCOUNTER — RESEARCH ENCOUNTER (OUTPATIENT)
Dept: RESEARCH | Facility: HOSPITAL | Age: 65
End: 2020-06-09

## 2020-06-09 PROBLEM — I48.0 PAROXYSMAL ATRIAL FIBRILLATION: Status: ACTIVE | Noted: 2020-06-09

## 2020-06-09 LAB
ANION GAP SERPL CALC-SCNC: 11 MMOL/L (ref 8–16)
APTT BLDCRRT: 29.5 SEC (ref 21–32)
BASOPHILS # BLD AUTO: 0.04 K/UL (ref 0–0.2)
BASOPHILS NFR BLD: 0.5 % (ref 0–1.9)
BUN SERPL-MCNC: 29 MG/DL (ref 8–23)
CALCIUM SERPL-MCNC: 8.6 MG/DL (ref 8.7–10.5)
CHLORIDE SERPL-SCNC: 102 MMOL/L (ref 95–110)
CO2 SERPL-SCNC: 28 MMOL/L (ref 23–29)
CREAT SERPL-MCNC: 0.8 MG/DL (ref 0.5–1.4)
DIFFERENTIAL METHOD: ABNORMAL
EOSINOPHIL # BLD AUTO: 0.4 K/UL (ref 0–0.5)
EOSINOPHIL NFR BLD: 5 % (ref 0–8)
ERYTHROCYTE [DISTWIDTH] IN BLOOD BY AUTOMATED COUNT: 15 % (ref 11.5–14.5)
EST. GFR  (AFRICAN AMERICAN): >60 ML/MIN/1.73 M^2
EST. GFR  (NON AFRICAN AMERICAN): >60 ML/MIN/1.73 M^2
GLUCOSE SERPL-MCNC: 98 MG/DL (ref 70–110)
HCT VFR BLD AUTO: 31.8 % (ref 40–54)
HGB BLD-MCNC: 10.2 G/DL (ref 14–18)
IMM GRANULOCYTES # BLD AUTO: 0.03 K/UL (ref 0–0.04)
IMM GRANULOCYTES NFR BLD AUTO: 0.4 % (ref 0–0.5)
INR PPP: 0.9 (ref 0.8–1.2)
LYMPHOCYTES # BLD AUTO: 0.9 K/UL (ref 1–4.8)
LYMPHOCYTES NFR BLD: 11.4 % (ref 18–48)
MAGNESIUM SERPL-MCNC: 2.4 MG/DL (ref 1.6–2.6)
MCH RBC QN AUTO: 28.2 PG (ref 27–31)
MCHC RBC AUTO-ENTMCNC: 32.1 G/DL (ref 32–36)
MCV RBC AUTO: 88 FL (ref 82–98)
MONOCYTES # BLD AUTO: 1.3 K/UL (ref 0.3–1)
MONOCYTES NFR BLD: 16.7 % (ref 4–15)
NEUTROPHILS # BLD AUTO: 5 K/UL (ref 1.8–7.7)
NEUTROPHILS NFR BLD: 66 % (ref 38–73)
NRBC BLD-RTO: 0 /100 WBC
PHOSPHATE SERPL-MCNC: 2.2 MG/DL (ref 2.7–4.5)
PLATELET # BLD AUTO: 337 K/UL (ref 150–350)
PMV BLD AUTO: 10 FL (ref 9.2–12.9)
POTASSIUM SERPL-SCNC: 4.9 MMOL/L (ref 3.5–5.1)
PROTHROMBIN TIME: 9.9 SEC (ref 9–12.5)
RBC # BLD AUTO: 3.62 M/UL (ref 4.6–6.2)
SODIUM SERPL-SCNC: 141 MMOL/L (ref 136–145)
WBC # BLD AUTO: 7.53 K/UL (ref 3.9–12.7)

## 2020-06-09 PROCEDURE — 25000003 PHARM REV CODE 250: Performed by: NURSE PRACTITIONER

## 2020-06-09 PROCEDURE — 97535 SELF CARE MNGMENT TRAINING: CPT

## 2020-06-09 PROCEDURE — 85025 COMPLETE CBC W/AUTO DIFF WBC: CPT

## 2020-06-09 PROCEDURE — 85610 PROTHROMBIN TIME: CPT

## 2020-06-09 PROCEDURE — 80048 BASIC METABOLIC PNL TOTAL CA: CPT

## 2020-06-09 PROCEDURE — 36415 COLL VENOUS BLD VENIPUNCTURE: CPT

## 2020-06-09 PROCEDURE — 25000003 PHARM REV CODE 250: Performed by: STUDENT IN AN ORGANIZED HEALTH CARE EDUCATION/TRAINING PROGRAM

## 2020-06-09 PROCEDURE — 83735 ASSAY OF MAGNESIUM: CPT

## 2020-06-09 PROCEDURE — 63600175 PHARM REV CODE 636 W HCPCS

## 2020-06-09 PROCEDURE — 63600175 PHARM REV CODE 636 W HCPCS: Performed by: NURSE PRACTITIONER

## 2020-06-09 PROCEDURE — 85730 THROMBOPLASTIN TIME PARTIAL: CPT

## 2020-06-09 PROCEDURE — 84100 ASSAY OF PHOSPHORUS: CPT

## 2020-06-09 PROCEDURE — 94761 N-INVAS EAR/PLS OXIMETRY MLT: CPT

## 2020-06-09 PROCEDURE — 99900035 HC TECH TIME PER 15 MIN (STAT)

## 2020-06-09 PROCEDURE — 20600001 HC STEP DOWN PRIVATE ROOM

## 2020-06-09 PROCEDURE — 63600175 PHARM REV CODE 636 W HCPCS: Performed by: STUDENT IN AN ORGANIZED HEALTH CARE EDUCATION/TRAINING PROGRAM

## 2020-06-09 PROCEDURE — 97116 GAIT TRAINING THERAPY: CPT

## 2020-06-09 RX ORDER — POTASSIUM CHLORIDE 750 MG/1
30 CAPSULE, EXTENDED RELEASE ORAL ONCE
Status: COMPLETED | OUTPATIENT
Start: 2020-06-09 | End: 2020-06-09

## 2020-06-09 RX ADMIN — DIBASIC SODIUM PHOSPHATE, MONOBASIC POTASSIUM PHOSPHATE AND MONOBASIC SODIUM PHOSPHATE 1 TABLET: 852; 155; 130 TABLET ORAL at 02:06

## 2020-06-09 RX ADMIN — METHOCARBAMOL TABLETS 500 MG: 500 TABLET, COATED ORAL at 04:06

## 2020-06-09 RX ADMIN — ACETAMINOPHEN 1000 MG: 500 TABLET ORAL at 09:06

## 2020-06-09 RX ADMIN — FUROSEMIDE 40 MG: 10 INJECTION, SOLUTION INTRAMUSCULAR; INTRAVENOUS at 08:06

## 2020-06-09 RX ADMIN — ACETAMINOPHEN 1000 MG: 500 TABLET ORAL at 02:06

## 2020-06-09 RX ADMIN — METHOCARBAMOL TABLETS 500 MG: 500 TABLET, COATED ORAL at 08:06

## 2020-06-09 RX ADMIN — FUROSEMIDE 40 MG: 10 INJECTION, SOLUTION INTRAMUSCULAR; INTRAVENOUS at 10:06

## 2020-06-09 RX ADMIN — AMIODARONE HYDROCHLORIDE 150 MG: 1.5 INJECTION, SOLUTION INTRAVENOUS at 09:06

## 2020-06-09 RX ADMIN — ATORVASTATIN CALCIUM 80 MG: 20 TABLET, FILM COATED ORAL at 08:06

## 2020-06-09 RX ADMIN — OXYCODONE HYDROCHLORIDE 10 MG: 10 TABLET ORAL at 09:06

## 2020-06-09 RX ADMIN — OXYCODONE HYDROCHLORIDE 10 MG: 10 TABLET ORAL at 05:06

## 2020-06-09 RX ADMIN — ASPIRIN 325 MG: 325 TABLET, COATED ORAL at 08:06

## 2020-06-09 RX ADMIN — OXYCODONE HYDROCHLORIDE 5 MG: 5 TABLET ORAL at 02:06

## 2020-06-09 RX ADMIN — METHOCARBAMOL TABLETS 500 MG: 500 TABLET, COATED ORAL at 12:06

## 2020-06-09 RX ADMIN — MUPIROCIN 1 G: 20 OINTMENT TOPICAL at 08:06

## 2020-06-09 RX ADMIN — METOPROLOL TARTRATE 25 MG: 25 TABLET, FILM COATED ORAL at 08:06

## 2020-06-09 RX ADMIN — AMIODARONE HYDROCHLORIDE 1 MG/MIN: 1.8 INJECTION, SOLUTION INTRAVENOUS at 09:06

## 2020-06-09 RX ADMIN — OXYCODONE HYDROCHLORIDE 5 MG: 5 TABLET ORAL at 08:06

## 2020-06-09 RX ADMIN — POTASSIUM CHLORIDE 30 MEQ: 750 CAPSULE, EXTENDED RELEASE ORAL at 12:06

## 2020-06-09 RX ADMIN — METOPROLOL TARTRATE 25 MG: 25 TABLET, FILM COATED ORAL at 09:06

## 2020-06-09 RX ADMIN — MUPIROCIN 1 G: 20 OINTMENT TOPICAL at 09:06

## 2020-06-09 RX ADMIN — POTASSIUM CHLORIDE 20 MEQ: 1500 TABLET, EXTENDED RELEASE ORAL at 09:06

## 2020-06-09 RX ADMIN — AMIODARONE HYDROCHLORIDE 1 MG/MIN: 1.8 INJECTION, SOLUTION INTRAVENOUS at 03:06

## 2020-06-09 RX ADMIN — PANTOPRAZOLE SODIUM 40 MG: 40 TABLET, DELAYED RELEASE ORAL at 05:06

## 2020-06-09 RX ADMIN — METOPROLOL TARTRATE 5 MG: 1 INJECTION, SOLUTION INTRAVENOUS at 09:06

## 2020-06-09 RX ADMIN — METHOCARBAMOL TABLETS 500 MG: 500 TABLET, COATED ORAL at 09:06

## 2020-06-09 NOTE — PT/OT/SLP PROGRESS
Physical Therapy      Patient Name:  Jeffery Melton   MRN:  [id number]  Admitting Diagnosis:  S/P CABG (coronary artery bypass graft)   Recent Surgery: Procedure(s) (LRB):  CORONARY ARTERY BYPASS GRAFT (CABG) x 3  (N/A) 4 Days Post-Op  Admit Date: 6/5/2020  Length of Stay: 4 days    1222: Patient not seen today due to RN hold; Pt in a-fib with HR in 140s. Jeffery Melton's plan of care and PT goals reviewed on this date and remain appropriate. Will follow-up for progressive mobility tomorrow, 6/10/2020, or as medically appropriate in POC.    Winsome Coles, PT, DPT  6/9/2020   Pager: [phone]    UPDATE 2288: Pt converted back to NSR and RN pablo'ed for ambulation. RN present for vital sign monitoring throughout with portable monitor. See PT note from 9496 for further details.

## 2020-06-09 NOTE — CODE/ RAPID DOCUMENTATION
Rapid Response Nurse Chart Check     Chart check completed, abnormal VS noted. Charge RN, Claudette contacted. Pt receiving IV lopressor now. Please call [phone] for further concerns or assistance.

## 2020-06-09 NOTE — PLAN OF CARE
Discharge Recommendation: Home, no PT needs.    0 goals met today. PT goals appropriate.    Progressive Mobility Protocol: Patient is safe to perform ambulation 3-4x/day with contact guard assistance with nursing staff.    Winsome Coles, PT, DPT  2020  Pager: [phone]        Problem: Physical Therapy Goal  Goal: Physical Therapy Goal  Description  Goals to be met by: 2020    Patient will increase functional independence with mobility by performin. Supine to sit with Contact Guard Assistance - not met  2. Sit to stand transfer with Supervision - not met  3. Gait  x 150 feet with Supervision - not met  4. Ascend/descend 6 stair with bilateral Handrails Contact Guard Assistance - not met     Outcome: Ongoing, Progressing

## 2020-06-09 NOTE — PROGRESS NOTES
Pt converted back into NSR with HR 60s-70s.  Amio gtt continues to infuse. Frequents being performed per orders. Will continue to monitor.        06/09/20 1010 06/09/20 1015   Vital Signs   Pulse (!) 142 68   Heart Rate Source Monitor Monitor   /70 103/64   MAP (mmHg) 82 77   BP Location Left arm Left arm   BP Method Automatic Automatic   Patient Position Sitting Sitting

## 2020-06-09 NOTE — PROGRESS NOTES
Pt converted back into afib c RVR.  Amio gtt continued. Frequents obtained as ordered.  Will continue to monitor.        06/09/20 1020   Vital Signs   Pulse (!) 145   Heart Rate Source Monitor   /67   MAP (mmHg) 78   BP Location Left arm   BP Method Automatic   Patient Position Sitting

## 2020-06-09 NOTE — PLAN OF CARE
Recommendations    1. Continue Cardiac diet.   2. Weekly weights.     Goals: 1. >75% of EEN, EPN by RD follow up.   Nutrition Goal Status: new  Communication of RD Recs: other (comment)(POC)

## 2020-06-09 NOTE — PROGRESS NOTES
[facility]  Cardiothoracic Surgery  Progress Note    Patient Name: Jeffery Melton  MRN: [id number]  Admission Date: 6/5/2020  Hospital Length of Stay: 4 days  Code Status: Prior   Attending Physician: Anuj Ceron MD   Referring Provider: Anuj Ceron MD  Principal Problem:S/P CABG (coronary artery bypass graft)            Subjective:     Post-Op Info:  Procedure(s) (LRB):  CORONARY ARTERY BYPASS GRAFT (CABG) x 3  (N/A)   4 Days Post-Op     Interval History: Rapid afib last night which converted to NSR.  This morning patient was walking and went back into rapid afib in the 150's.  Otherwise, no complaints.  Patient is ambulating well with good UOP.     Review of Systems   Constitution: Negative for decreased appetite, fever and malaise/fatigue.   Cardiovascular: Negative for chest pain, claudication, dyspnea on exertion, irregular heartbeat, leg swelling, palpitations and syncope.   Respiratory: Negative for cough and shortness of breath.    Hematologic/Lymphatic: Negative for bleeding problem.   Skin: Negative for rash.   Musculoskeletal: Negative for arthritis and myalgias.   Gastrointestinal: Negative for abdominal pain, diarrhea, melena, nausea and vomiting.   Genitourinary: Negative for dysuria.   Neurological: Negative for headaches, paresthesias and seizures.     Medications:  Continuous Infusions:   amiodarone in dextrose 5% 1 mg/min (06/09/20 0950)    amiodarone in dextrose 5%       Scheduled Meds:   acetaminophen  1,000 mg Oral Q8H    aspirin  325 mg Oral Daily    atorvastatin  80 mg Oral Daily    docusate sodium  100 mg Oral BID    furosemide (LASIX) IV  40 mg Intravenous Q12H    methocarbamoL  500 mg Oral QID    metoprolol tartrate  25 mg Oral BID    mupirocin  1 g Nasal BID    pantoprazole  40 mg Oral Before breakfast    polyethylene glycol  17 g Oral Daily    potassium chloride  20 mEq Oral Q12H     PRN Meds:bisacodyL, Dextrose 10% Bolus, Dextrose 10% Bolus,  metoclopramide HCl, metoprolol, ondansetron, oxyCODONE, oxyCODONE, sodium chloride 0.9%     Objective:     Vital Signs (Most Recent):  Temp: 98.1 °F (36.7 °C) (06/09/20 1158)  Pulse: (!) 142 (06/09/20 1230)  Resp: 17 (06/09/20 1158)  BP: 113/74 (06/09/20 1230)  SpO2: (!) 93 % (06/09/20 1158) Vital Signs (24h Range):  Temp:  [97.6 °F (36.4 °C)-99 °F (37.2 °C)] 98.1 °F (36.7 °C)  Pulse:  [] 142  Resp:  [17-18] 17  SpO2:  [90 %-97 %] 93 %  BP: ()/(56-87) 113/74     Weight: 100.7 kg (222 lb 0.1 oz)  Body mass index is 30.96 kg/m².    SpO2: (!) 93 %  O2 Device (Oxygen Therapy): room air    Intake/Output - Last 3 Shifts       06/07 0700 - 06/08 0659 06/08 0700 - 06/09 0659 06/09 0700 - 06/10 0659    P.O. 939 1182 120    Total Intake(mL/kg) 939 (9.1) 1182 (11.7) 120 (1.2)    Urine (mL/kg/hr) 1725 (0.7) 1750 (0.7) 250 (0.4)    Emesis/NG output 5      Stool 0 0     Chest Tube 60      Total Output 1790 1750 250    Net -851 -568 -130           Stool Occurrence 0 x 1 x           Lines/Drains/Airways     Drain                 Colostomy LLQ -- days          Peripheral Intravenous Line                 Peripheral IV - Single Lumen 06/06/20 1215 20 G Posterior;Right Hand 3 days                Physical Exam   Constitutional: He is oriented to person, place, and time. He appears well-developed and well-nourished.   Cardiovascular: Normal rate, regular rhythm and normal heart sounds.   Pulmonary/Chest: Effort normal and breath sounds normal.   Abdominal: Soft.   Colostomy present   Musculoskeletal: Normal range of motion.   Neurological: He is alert and oriented to person, place, and time.   Skin: Skin is warm, dry and intact.   Sternal Incision CDI   Psychiatric: He has a normal mood and affect.       Significant Labs:  BMP:   Recent Labs   Lab 06/09/20  0431   GLU 98      K 4.9      CO2 28   BUN 29*   CREATININE 0.8   CALCIUM 8.6*   MG 2.4     CBC:   Recent Labs   Lab 06/09/20  0431   WBC 7.53   RBC 3.62*    HGB 10.2*   HCT 31.8*      MCV 88   MCH 28.2   MCHC 32.1     CMP:   Recent Labs   Lab 06/09/20  0431   GLU 98   CALCIUM 8.6*      K 4.9   CO2 28      BUN 29*   CREATININE 0.8     Coagulation:   Recent Labs   Lab 06/09/20 0431   INR 0.9   APTT 29.5       Significant Diagnostics:  I have reviewed all pertinent imaging results/findings within the past 24 hours.    Assessment/Plan:     * S/P CABG (coronary artery bypass graft)  Jeffery Melton is a 64 y.o. male s/p CABG x 3 for CAD on 06/06/2020  -starting scheduled tylenol 1g q8 hrs and schduled Robaxin for back pain  -PRN oxycodone  -on 3L, wean for sats > 88%  -will remove chest tube and pacer wires   -Aspirin 325/statin  -increasing metoprolol to 25 BID  -good UOP on lasix 40 BID, keeping burns until tomorrow  -starting potassium 20 BID  -cardiac diet with 1500mL fluid restriction  -bowel regimen  -periop vanc  -ambulate QID    DISPO: Possible discharge home tomorrow    Paroxysmal atrial fibrillation  -Amiodarone 150 mg bolus load followed by continuous 24 hour infusion  -Tele monitoring  -possible PACeS trial  -Will assess for anticoagulation need    Other specified anemias  -Expected postoperative blood loss secondary to CABG  -CBC daily to monitor trends    Hypophosphatemia  -Phosphrous labs daily; today 2.2  -Will replace today and repeat lab in the AM  -Continue to monitor trends    Hyperglycemia  -Endocrine following        Brook Solitario NP  Cardiothoracic Surgery  [facility]

## 2020-06-09 NOTE — ASSESSMENT & PLAN NOTE
-Amiodarone 150 mg bolus load followed by continuous 24 hour infusion  -Tele monitoring  -possible PACeS trial  -Will assess for anticoagulation need

## 2020-06-09 NOTE — ASSESSMENT & PLAN NOTE
Jeffery Melton is a 64 y.o. male s/p CABG x 3 for CAD on 06/06/2020  -starting scheduled tylenol 1g q8 hrs and schduled Robaxin for back pain  -PRN oxycodone  -on 3L, wean for sats > 88%  -will remove chest tube and pacer wires   -Aspirin 325/statin  -increasing metoprolol to 25 BID  -good UOP on lasix 40 BID, keeping burns until tomorrow  -starting potassium 20 BID  -cardiac diet with 1500mL fluid restriction  -bowel regimen  -periop vanc  -ambulate QID    DISPO: Possible discharge home tomorrow

## 2020-06-09 NOTE — PLAN OF CARE
Pt free of falls/injuries. No c/o SOB or chest pain. NSR on tele. VSS. MS incision CDI and KENDELL. Pt receiving 40 mg IV q12h. TEDs in place. Oxy q4h. P;an is for possible d/c 6/9/20 if pt ambulated x 3. POC reviewed with pt. Call light in reach. Bed alarm activated. Bed @ lowest position. Will continue to monitor.

## 2020-06-09 NOTE — PROGRESS NOTES
Pt converted into Afib c RVR while ambulating in hallway.  Pt reports feeling like he was taking shallow breaths but otherwise denies any c/o palpitations or discomfort.  GERRY Solitario NP notified; stated she will be at the bedside shortly with Dr. Ceron.     MD and NP at the bedside. Metoprolol 5mg IVP administered as ordered; pt still afib c RVR in 140s-150s.  Plan to initiate Amio gtt.  Will implement as ordered.         06/09/20 0904 06/09/20 0905 06/09/20 0906   Vital Signs   Pulse (!) 150 (!) 150 (!) 152   Heart Rate Source Monitor Monitor Monitor   /72 120/76 112/74   MAP (mmHg) 86 92 90   BP Location Left arm Left arm Left arm   BP Method Automatic Automatic Automatic   Patient Position Lying Lying Lying      06/09/20 0907 06/09/20 0910   Vital Signs   Pulse (!) 150 (!) 147   Heart Rate Source Monitor Monitor   BP 95/71 102/73   MAP (mmHg) 79 81   BP Location Left arm Left arm   BP Method Automatic Automatic   Patient Position Sitting Sitting

## 2020-06-09 NOTE — SUBJECTIVE & OBJECTIVE
Interval History: Rapid afib last night which converted to NSR.  This morning patient was walking and went back into rapid afib in the 150's.  Otherwise, no complaints.  Patient is ambulating well with good UOP.     Review of Systems   Constitution: Negative for decreased appetite, fever and malaise/fatigue.   Cardiovascular: Negative for chest pain, claudication, dyspnea on exertion, irregular heartbeat, leg swelling, palpitations and syncope.   Respiratory: Negative for cough and shortness of breath.    Hematologic/Lymphatic: Negative for bleeding problem.   Skin: Negative for rash.   Musculoskeletal: Negative for arthritis and myalgias.   Gastrointestinal: Negative for abdominal pain, diarrhea, melena, nausea and vomiting.   Genitourinary: Negative for dysuria.   Neurological: Negative for headaches, paresthesias and seizures.     Medications:  Continuous Infusions:   amiodarone in dextrose 5% 1 mg/min (06/09/20 0950)    amiodarone in dextrose 5%       Scheduled Meds:   acetaminophen  1,000 mg Oral Q8H    aspirin  325 mg Oral Daily    atorvastatin  80 mg Oral Daily    docusate sodium  100 mg Oral BID    furosemide (LASIX) IV  40 mg Intravenous Q12H    methocarbamoL  500 mg Oral QID    metoprolol tartrate  25 mg Oral BID    mupirocin  1 g Nasal BID    pantoprazole  40 mg Oral Before breakfast    polyethylene glycol  17 g Oral Daily    potassium chloride  20 mEq Oral Q12H     PRN Meds:bisacodyL, Dextrose 10% Bolus, Dextrose 10% Bolus, metoclopramide HCl, metoprolol, ondansetron, oxyCODONE, oxyCODONE, sodium chloride 0.9%     Objective:     Vital Signs (Most Recent):  Temp: 98.1 °F (36.7 °C) (06/09/20 1158)  Pulse: (!) 142 (06/09/20 1230)  Resp: 17 (06/09/20 1158)  BP: 113/74 (06/09/20 1230)  SpO2: (!) 93 % (06/09/20 1158) Vital Signs (24h Range):  Temp:  [97.6 °F (36.4 °C)-99 °F (37.2 °C)] 98.1 °F (36.7 °C)  Pulse:  [] 142  Resp:  [17-18] 17  SpO2:  [90 %-97 %] 93 %  BP: ()/(56-87) 113/74      Weight: 100.7 kg (222 lb 0.1 oz)  Body mass index is 30.96 kg/m².    SpO2: (!) 93 %  O2 Device (Oxygen Therapy): room air    Intake/Output - Last 3 Shifts       06/07 0700 - 06/08 0659 06/08 0700 - 06/09 0659 06/09 0700 - 06/10 0659    P.O. 939 1182 120    Total Intake(mL/kg) 939 (9.1) 1182 (11.7) 120 (1.2)    Urine (mL/kg/hr) 1725 (0.7) 1750 (0.7) 250 (0.4)    Emesis/NG output 5      Stool 0 0     Chest Tube 60      Total Output 1790 1750 250    Net -851 -568 -130           Stool Occurrence 0 x 1 x           Lines/Drains/Airways     Drain                 Colostomy LLQ -- days          Peripheral Intravenous Line                 Peripheral IV - Single Lumen 06/06/20 1215 20 G Posterior;Right Hand 3 days                Physical Exam   Constitutional: He is oriented to person, place, and time. He appears well-developed and well-nourished.   Cardiovascular: Normal rate, regular rhythm and normal heart sounds.   Pulmonary/Chest: Effort normal and breath sounds normal.   Abdominal: Soft.   Colostomy present   Musculoskeletal: Normal range of motion.   Neurological: He is alert and oriented to person, place, and time.   Skin: Skin is warm, dry and intact.   Sternal Incision CDI   Psychiatric: He has a normal mood and affect.       Significant Labs:  BMP:   Recent Labs   Lab 06/09/20 0431   GLU 98      K 4.9      CO2 28   BUN 29*   CREATININE 0.8   CALCIUM 8.6*   MG 2.4     CBC:   Recent Labs   Lab 06/09/20 0431   WBC 7.53   RBC 3.62*   HGB 10.2*   HCT 31.8*      MCV 88   MCH 28.2   MCHC 32.1     CMP:   Recent Labs   Lab 06/09/20 0431   GLU 98   CALCIUM 8.6*      K 4.9   CO2 28      BUN 29*   CREATININE 0.8     Coagulation:   Recent Labs   Lab 06/09/20 0431   INR 0.9   APTT 29.5       Significant Diagnostics:  I have reviewed all pertinent imaging results/findings within the past 24 hours.

## 2020-06-09 NOTE — PLAN OF CARE
Goals remain appropriate, continue with OT POC.    Problem: Occupational Therapy Goal  Goal: Occupational Therapy Goal  Description  Goals to be met by: 6/16/20     Patient will increase functional independence with ADLs by performing:    Feeding with [place].goal met  UE Dressing with Supervision  LE Dressing with Supervision.  Grooming while standing at sink with Supervision.  Toileting from toilet with Supervision.  Toilet transfer to toilet with Supervision. Discontinue-colostomy      6/9/2020 1419 by ANNE-MARIE Petit  Outcome: Ongoing, Progressing  6/9/2020 1419 by ANNE-MARIE Petit  Outcome: Ongoing, Progressing     ANNE-MARIE Petti  6/9/2020  Rehab Services

## 2020-06-09 NOTE — PLAN OF CARE
Goals remain appropriate, continue with OT POC.    Problem: Occupational Therapy Goal  Goal: Occupational Therapy Goal  Description  Goals to be met by: 6/16/20     Patient will increase functional independence with ADLs by performing:    Feeding with [place].goal met  UE Dressing with Supervision  LE Dressing with Supervision.  Grooming while standing at sink with Supervision.  Toileting from toilet with Supervision.  Toilet transfer to toilet with Supervision. Discontinue-colostomy      Outcome: Ongoing, Progressing     ANNE-MARIE Petit  6/9/2020  Rehab Services

## 2020-06-09 NOTE — ASSESSMENT & PLAN NOTE
Contributing Nutrition Diagnosis  Inadequate energy intake    Related to (etiology):   Decreased appetite    Signs and Symptoms (as evidenced by):   PO intake >75% of meals    Interventions (treatment strategy):  Collaboration with other providers    Nutrition Diagnosis Status:   New

## 2020-06-09 NOTE — ASSESSMENT & PLAN NOTE
-Phosphrous labs daily; today 2.2  -Will replace today and repeat lab in the AM  -Continue to monitor trends

## 2020-06-09 NOTE — PT/OT/SLP PROGRESS
Occupational Therapy   Treatment    Name: Jeffery Melton  MRN: [id number]  Admitting Diagnosis:  S/P CABG (coronary artery bypass graft)  4 Days Post-Op    Recommendations:     Discharge Recommendations: home  Discharge Equipment Recommendations:  none  Barriers to discharge:  None    Assessment:     Jeffery Melton is a 64 y.o. male with a medical diagnosis of S/P CABG (coronary artery bypass graft).  He presents with decline in ADLs and functional mobility. Provided ADL compensatory strategies to maximize independence.  Performance deficits affecting function are weakness, impaired functional mobilty, impaired self care skills, impaired balance, decreased upper extremity function, impaired cardiopulmonary response to activity, orthopedic precautions, pain.     Rehab Prognosis:  Good; patient would benefit from acute skilled OT services to address these deficits and reach maximum level of function.       Plan:     Patient to be seen 5 x/week to address the above listed problems via self-care/home management, therapeutic activities, therapeutic exercises, neuromuscular re-education  · Plan of Care Expires: 07/06/20  · Plan of Care Reviewed with: patient    Subjective     Pain/Comfort:  · Pain Rating 1: 0/10  · Location - Side 1: Bilateral  · Location - Orientation 1: midline  · Location 1: chest  · Pain Addressed 1: Reposition, Distraction  · Pain Rating Post-Intervention 1: 0/10    Objective:     Communicated with: RN prior to session.  Patient found up in chair with telemetry, colostomy, peripheral IV upon OT entry to room.    General Precautions: Standard, fall, sternal   Orthopedic Precautions:N/A   Braces: N/A     Occupational Performance:     Bed Mobility:    · Demonstrated bed mobility training with sternal precautions.      Functional Mobility/Transfers:  · Functional Mobility: pt declined as long as he is on IV. Explained it is mobilize with an IV and it is not anything to worry about. Pt politely declined until IV  was done. His wishes were respected    Activities of Daily Living:  · Provided ADL compensatory strategies for grooming, toileting, UB dressing/LB dressing and bathing.   · Reviewed sternal precautions and energy conservation techniques.   · Offered to assist with bathing and grooming task 2-3 times during session but pt politely declined until IV was complete.      [facility] 6 Click ADL: 14    Treatment & Education:  · Pt educated on role of OT in acute care setting.   · Assisted with ADLs and functional mobility with assist levels noted above    Patient left up in chair with all lines intact and call button in reachEducation:      GOALS:   Multidisciplinary Problems     Occupational Therapy Goals        Problem: Occupational Therapy Goal    Goal Priority Disciplines Outcome Interventions   Occupational Therapy Goal     OT, PT/OT Ongoing, Progressing    Description:  Goals to be met by: 6/16/20     Patient will increase functional independence with ADLs by performing:    Feeding with [place].goal met  UE Dressing with Supervision  LE Dressing with Supervision.  Grooming while standing at sink with Supervision.  Toileting from toilet with Supervision.  Toilet transfer to toilet with Supervision. Discontinue-colostomy                       Time Tracking:     OT Date of Treatment: 06/09/20  OT Start Time: 1028  OT Stop Time: 1038  OT Total Time (min): 10 min    Billable Minutes:Self Care/Home Management 10    ANNE-MARIE Trent  6/9/2020

## 2020-06-09 NOTE — PLAN OF CARE
Pt free of falls/trauma/injuries.  Denies c/o SOB; O2Sats remain stable on RA.  Incentive spirometry encouraged throughout shift.  Incisional pain managed with PO analgesics.  Generalized skin remains CDI; trace edema noted to BLEs.  TEDs in place.  Incisions remain CDI.  Pt being diuresed with Lasix 40mg IVP BID; diuresing well.   Wt remains stable.  Electrolytes replaced as ordered.  Pt went into afib c RVR this morning; Amio gtt initiated and pt has since converted back to NSR.  PT/OT following; pt able to ambulate in hallway with standby assist.  Plan to continue with post-op care.  Pt tolerating plan of care.

## 2020-06-09 NOTE — ASSESSMENT & PLAN NOTE
[facility]  Crisis/Safety Plan    Name:  Gopal Ceja  MRN:  [id number]  Date:  2017    Warning signs that a crisis may be developing for me or I may be at risk:  1) Getting depressed about current issues  2) Buying alcohol  3)  Not seeking treatment    Coping strategies I can use on my own (relaxation, physical activity, etc):  1) Rest and eating right  2) Get treatment for mental health issues  3)     Ways I can make my environment safe:  1)  No alcohol   2)  Be around sober people   3)    Things I want to tell myself when I feel a crisis developin) I can stop drinking   2) I can stay clean so I can see my son  3)  I can get outpt counseling for help    People I can contact for support or distraction (and their phone numbers):  1) Grandparent June [phone]  2)   3)    If I’m not able to reach my support people, or the above strategies don’t help, I can contact the following professionals, agencies, or hotlines:  1) Crisis Call Center ():  [phone] -OR- [phone]  2) Crisis Text Line ():  Text START to 045580  3) June [phone]  4)     Anat Riojas R.N.     -Endocrine following

## 2020-06-09 NOTE — PROGRESS NOTES
06/09/2020:  Approached the patient regarding possible participation in the PACeS trial. Patient was given an introduction to the study and addressed questions and concerns verbalized by the patient.   Patient wanted to speak with family before consenting.   A copy of the informed consent, patient information brochure, and contact information for research team were given to the patient.

## 2020-06-09 NOTE — PT/OT/SLP PROGRESS
Physical Therapy Treatment    Patient Name:  Jeffery Melton   MRN:  [id number]  Admitting Diagnosis:  S/P CABG (coronary artery bypass graft)   Recent Surgery: Procedure(s) (LRB):  CORONARY ARTERY BYPASS GRAFT (CABG) x 3  (N/A) 4 Days Post-Op  Admit Date: 6/5/2020  Length of Stay: 4 days    Recommendations:     Discharge Recommendations:  home   Discharge Equipment Recommendations: none   Barriers to discharge: None    Assessment:     Jeffery Melton is a 64 y.o. male admitted with a medical diagnosis of S/P CABG (coronary artery bypass graft).  He presents with the following impairments/functional limitations:  weakness, impaired endurance, impaired functional mobilty, impaired cardiopulmonary response to activity, gait instability, impaired balance, impaired self care skills, decreased upper extremity function. Pt tolerated session well today demonstrating improvements in ambulation ability as well as activity tolerance, as seen by ability to ambulate an increased distance on this date. Pt with 1 minor LOB during ambulation but able to recover with Min A. Still overall limited by decreased endurance as pt req'd 2 standing rest breaks during trial. Pt will continue to benefit from skilled PT services during this hospital admit to continue to improve transfer ability and efficiency as well as continue to progress pt's ambulation distance and cardiopulmonary endurance to maximize pt's functional independence and return to PLOF.    Rehab Prognosis: Good; patient would benefit from acute skilled PT services to address these deficits and reach maximum level of function.    Recent Surgery: Procedure(s) (LRB):  CORONARY ARTERY BYPASS GRAFT (CABG) x 3  (N/A) 4 Days Post-Op    Plan:     During this hospitalization, patient to be seen 5 x/week to address the identified rehab impairments via gait training, therapeutic activities, therapeutic exercises and progress toward the following goals:    · Plan of Care Expires:   07/05/20    Subjective     RN notified prior to session. Wife present upon PT entrance into room.    Chief Complaint: Pt with no complaints  Patient/Family Comments/goals: go home  Pain/Comfort:  · Pain Rating 1: 0/10  · Pain Rating Post-Intervention 1: 0/10      Objective:     Additional staff present: RN present throughout for vital sign monitoring due to pt in a-fib this AM    Patient found up in chair with: telemetry, peripheral IV, colostomy   Mental Status: Patient is oriented to AxOx4 and follows multistep commands. Patient is Alert and Cooperative during session.    General Precautions: Standard, Cardiac fall, sternal   Orthopedic Precautions:N/A   Braces: N/A   Body mass index is 30.96 kg/m².  Oxygen Device: Room Air    Outcome Measures:  AM-PAC 6 CLICK MOBILITY  Turning over in bed (including adjusting bedclothes, sheets and blankets)?: 4  Sitting down on and standing up from a chair with arms (e.g., wheelchair, bedside commode, etc.): 4  Moving from lying on back to sitting on the side of the bed?: 4  Moving to and from a bed to a chair (including a wheelchair)?: 3  Need to walk in hospital room?: 3  Climbing 3-5 steps with a railing?: 3  Basic Mobility Total Score: 21     Functional Mobility:    Bed Mobility:   · Pt found/returned to bedside chair    Transfers:   · Sit <> Stand Transfer: supervision with no assistive device   · Stand <> Sit Transfer: supervision with no assistive device   · [id number] from bedside chair    Standing Balance:   Assistance Level Required: Stand-by Assistance   Patient used: no assistive device       Gait:  · Patient ambulated: ~400 ft (2 standing rest breaks)   · Patient required: standy by assistance and contact guard  · Patient used:  no assistive device   · Gait Pattern observed: reciprocal gait  · Gait Deviation(s): occasional unsteady gait, decreased step length, decreased weight shift, decreased arm swing, shuffle gait and decreased eliza  · Impairments due to:  decreased endurance  · Portable monitor utilized  · all lines remained intact throughout ambulation trial  · Nurse present for vital sign monitoring  · Comments: Pt with 1 LOB to the Rt with poor use of step strategy to recover, requiring Min A from therapist to remain upright. Pt otherwise steady with decreased eliza throughout. Cueing for pacing and pursed lip breathing.    Education:   Time provided for education, counseling and discussion of health disposition in regards to patient's current status   All questions answered within PT scope of practice and to patient's satisfaction   PT role in POC to address current functional deficits   Pt educated on proper body mechanics, safety techniques, and energy conservation with PT facilitation and cueing throughout session   Call nursing/pct to transfer to chair/use bathroom. Pt stated understanding.   Whiteboard updated with pt's current mobility status documented above   Safe to perform step transfer to/from chair/bedside commode SBA and no AD w/ nursing/PCT present   Pt to ambulate 3-4x/day CGA to SBA and no AD with nsg/PCT in order to maintain functional mobility   Importance of OOB tolerance 8-10 hrs/day to improve lung ventilation and expansion as well as strengthen postural musculature   Sternal Precautions: patient able to voice 3/3 precautions without assistance. Patient able to maintain precautions throughout session with no verbal cues.    Patient left up in chair with all lines intact, call button in reach and RN and wife present.    GOALS:   Multidisciplinary Problems     Physical Therapy Goals        Problem: Physical Therapy Goal    Goal Priority Disciplines Outcome Goal Variances Interventions   Physical Therapy Goal     PT, PT/OT Ongoing, Progressing     Description:  Goals to be met by: 2020    Patient will increase functional independence with mobility by performin. Supine to sit with Contact Guard Assistance - not met  2. Sit  to stand transfer with Supervision - not met  3. Gait  x 150 feet with Supervision - not met  4. Ascend/descend 6 stair with bilateral Handrails Contact Guard Assistance - not met                    Time Tracking:     PT Received On: 06/09/20  PT Start Time: 1520     PT Stop Time: 1538  PT Total Time (min): 18 min       Billable Minutes:   · Gait Training 18    Treatment Type: Treatment  PT/PTA: PT       Winsome Coles PT, DPT  6/9/2020  Pager: [phone]

## 2020-06-09 NOTE — PROGRESS NOTES
Dr. Webb notified that pt's HR sustaining @ 160 for 30 seconds. HR then dropped 117-140s, STAT EKG and CMP and Mag ordered. EKG confirmed A. Fib with RVR. Lopressor 5 mg administered x 3 foses over 5 minutes. Continuous cardiac monitoring and BP monitoring @ bedside during administration.BP stable during all administrations and HR @  140s during administrations. A. Fib with RVR resolved @ 2351, 7 minutes after third dose of Lopressor.  30 mEq Potassium administered for K+ of 3.7. Pt currently NSR 70s. Will continue to monitor.

## 2020-06-10 ENCOUNTER — RESEARCH ENCOUNTER (OUTPATIENT)
Dept: RESEARCH | Facility: HOSPITAL | Age: 65
End: 2020-06-10

## 2020-06-10 VITALS
BODY MASS INDEX: 30.86 KG/M2 | OXYGEN SATURATION: 99 % | WEIGHT: 220.44 LBS | RESPIRATION RATE: 20 BRPM | DIASTOLIC BLOOD PRESSURE: 62 MMHG | TEMPERATURE: 100 F | HEIGHT: 71 IN | HEART RATE: 79 BPM | SYSTOLIC BLOOD PRESSURE: 118 MMHG

## 2020-06-10 LAB
ANION GAP SERPL CALC-SCNC: 11 MMOL/L (ref 8–16)
BASOPHILS # BLD AUTO: 0.03 K/UL (ref 0–0.2)
BASOPHILS NFR BLD: 0.4 % (ref 0–1.9)
BUN SERPL-MCNC: 28 MG/DL (ref 8–23)
CALCIUM SERPL-MCNC: 8.3 MG/DL (ref 8.7–10.5)
CHLORIDE SERPL-SCNC: 98 MMOL/L (ref 95–110)
CO2 SERPL-SCNC: 28 MMOL/L (ref 23–29)
CREAT SERPL-MCNC: 0.8 MG/DL (ref 0.5–1.4)
DIFFERENTIAL METHOD: ABNORMAL
EOSINOPHIL # BLD AUTO: 0.4 K/UL (ref 0–0.5)
EOSINOPHIL NFR BLD: 5.4 % (ref 0–8)
ERYTHROCYTE [DISTWIDTH] IN BLOOD BY AUTOMATED COUNT: 15.1 % (ref 11.5–14.5)
EST. GFR  (AFRICAN AMERICAN): >60 ML/MIN/1.73 M^2
EST. GFR  (NON AFRICAN AMERICAN): >60 ML/MIN/1.73 M^2
GLUCOSE SERPL-MCNC: 120 MG/DL (ref 70–110)
HCT VFR BLD AUTO: 31.5 % (ref 40–54)
HGB BLD-MCNC: 9.8 G/DL (ref 14–18)
IMM GRANULOCYTES # BLD AUTO: 0.02 K/UL (ref 0–0.04)
IMM GRANULOCYTES NFR BLD AUTO: 0.3 % (ref 0–0.5)
LYMPHOCYTES # BLD AUTO: 0.8 K/UL (ref 1–4.8)
LYMPHOCYTES NFR BLD: 11.3 % (ref 18–48)
MAGNESIUM SERPL-MCNC: 2.2 MG/DL (ref 1.6–2.6)
MCH RBC QN AUTO: 28.2 PG (ref 27–31)
MCHC RBC AUTO-ENTMCNC: 31.1 G/DL (ref 32–36)
MCV RBC AUTO: 91 FL (ref 82–98)
MONOCYTES # BLD AUTO: 1.2 K/UL (ref 0.3–1)
MONOCYTES NFR BLD: 16.5 % (ref 4–15)
NEUTROPHILS # BLD AUTO: 4.6 K/UL (ref 1.8–7.7)
NEUTROPHILS NFR BLD: 66.1 % (ref 38–73)
NRBC BLD-RTO: 0 /100 WBC
PHOSPHATE SERPL-MCNC: 3.3 MG/DL (ref 2.7–4.5)
PLATELET # BLD AUTO: 342 K/UL (ref 150–350)
PMV BLD AUTO: 9.9 FL (ref 9.2–12.9)
POTASSIUM SERPL-SCNC: 3.2 MMOL/L (ref 3.5–5.1)
RBC # BLD AUTO: 3.48 M/UL (ref 4.6–6.2)
SODIUM SERPL-SCNC: 137 MMOL/L (ref 136–145)
WBC # BLD AUTO: 7.01 K/UL (ref 3.9–12.7)

## 2020-06-10 PROCEDURE — 63600175 PHARM REV CODE 636 W HCPCS: Performed by: NURSE PRACTITIONER

## 2020-06-10 PROCEDURE — 94799 UNLISTED PULMONARY SVC/PX: CPT

## 2020-06-10 PROCEDURE — 97530 THERAPEUTIC ACTIVITIES: CPT

## 2020-06-10 PROCEDURE — 63600175 PHARM REV CODE 636 W HCPCS: Performed by: STUDENT IN AN ORGANIZED HEALTH CARE EDUCATION/TRAINING PROGRAM

## 2020-06-10 PROCEDURE — 83735 ASSAY OF MAGNESIUM: CPT

## 2020-06-10 PROCEDURE — 99900035 HC TECH TIME PER 15 MIN (STAT)

## 2020-06-10 PROCEDURE — 36415 COLL VENOUS BLD VENIPUNCTURE: CPT

## 2020-06-10 PROCEDURE — 94761 N-INVAS EAR/PLS OXIMETRY MLT: CPT

## 2020-06-10 PROCEDURE — 97116 GAIT TRAINING THERAPY: CPT

## 2020-06-10 PROCEDURE — 85025 COMPLETE CBC W/AUTO DIFF WBC: CPT

## 2020-06-10 PROCEDURE — 25000003 PHARM REV CODE 250: Performed by: STUDENT IN AN ORGANIZED HEALTH CARE EDUCATION/TRAINING PROGRAM

## 2020-06-10 PROCEDURE — 84100 ASSAY OF PHOSPHORUS: CPT

## 2020-06-10 PROCEDURE — 25000003 PHARM REV CODE 250: Performed by: NURSE PRACTITIONER

## 2020-06-10 PROCEDURE — 80048 BASIC METABOLIC PNL TOTAL CA: CPT

## 2020-06-10 RX ORDER — FUROSEMIDE 20 MG/1
20 TABLET ORAL 2 TIMES DAILY
Qty: 60 TABLET | Refills: 1 | Status: SHIPPED | OUTPATIENT
Start: 2020-06-10 | End: 2021-06-10

## 2020-06-10 RX ORDER — POTASSIUM CHLORIDE 20 MEQ/1
20 TABLET, EXTENDED RELEASE ORAL EVERY 12 HOURS
Qty: 60 TABLET | Refills: 1 | Status: SHIPPED | OUTPATIENT
Start: 2020-06-10 | End: 2020-07-10

## 2020-06-10 RX ORDER — AMIODARONE HYDROCHLORIDE 200 MG/1
400 TABLET ORAL 2 TIMES DAILY
Qty: 120 TABLET | Refills: 1 | Status: SHIPPED | OUTPATIENT
Start: 2020-06-10 | End: 2021-06-10

## 2020-06-10 RX ORDER — OXYCODONE HYDROCHLORIDE 5 MG/1
5 TABLET ORAL EVERY 4 HOURS PRN
Qty: 42 TABLET | Refills: 0 | Status: SHIPPED | OUTPATIENT
Start: 2020-06-10

## 2020-06-10 RX ORDER — METHOCARBAMOL 500 MG/1
500 TABLET, FILM COATED ORAL 4 TIMES DAILY
Qty: 40 TABLET | Refills: 0 | Status: SHIPPED | OUTPATIENT
Start: 2020-06-10 | End: 2020-06-20

## 2020-06-10 RX ORDER — METOPROLOL TARTRATE 25 MG/1
25 TABLET, FILM COATED ORAL 2 TIMES DAILY
Qty: 60 TABLET | Refills: 11 | Status: SHIPPED | OUTPATIENT
Start: 2020-06-10 | End: 2021-06-10

## 2020-06-10 RX ORDER — POTASSIUM CHLORIDE 20 MEQ/1
40 TABLET, EXTENDED RELEASE ORAL ONCE
Status: COMPLETED | OUTPATIENT
Start: 2020-06-10 | End: 2020-06-10

## 2020-06-10 RX ORDER — DOCUSATE SODIUM 100 MG/1
100 CAPSULE, LIQUID FILLED ORAL 2 TIMES DAILY
Qty: 14 CAPSULE | Refills: 1 | Status: SHIPPED | OUTPATIENT
Start: 2020-06-10 | End: 2020-06-17

## 2020-06-10 RX ADMIN — ACETAMINOPHEN 1000 MG: 500 TABLET ORAL at 01:06

## 2020-06-10 RX ADMIN — ACETAMINOPHEN 1000 MG: 500 TABLET ORAL at 05:06

## 2020-06-10 RX ADMIN — POTASSIUM CHLORIDE 20 MEQ: 1500 TABLET, EXTENDED RELEASE ORAL at 09:06

## 2020-06-10 RX ADMIN — POTASSIUM CHLORIDE 40 MEQ: 1500 TABLET, EXTENDED RELEASE ORAL at 09:06

## 2020-06-10 RX ADMIN — POLYETHYLENE GLYCOL 3350 17 G: 17 POWDER, FOR SOLUTION ORAL at 09:06

## 2020-06-10 RX ADMIN — PANTOPRAZOLE SODIUM 40 MG: 40 TABLET, DELAYED RELEASE ORAL at 05:06

## 2020-06-10 RX ADMIN — METOPROLOL TARTRATE 25 MG: 25 TABLET, FILM COATED ORAL at 09:06

## 2020-06-10 RX ADMIN — AMIODARONE HYDROCHLORIDE 0.5 MG/MIN: 1.8 INJECTION, SOLUTION INTRAVENOUS at 02:06

## 2020-06-10 RX ADMIN — ASPIRIN 325 MG: 325 TABLET, COATED ORAL at 09:06

## 2020-06-10 RX ADMIN — MUPIROCIN 1 G: 20 OINTMENT TOPICAL at 09:06

## 2020-06-10 RX ADMIN — METHOCARBAMOL TABLETS 500 MG: 500 TABLET, COATED ORAL at 12:06

## 2020-06-10 RX ADMIN — ATORVASTATIN CALCIUM 80 MG: 20 TABLET, FILM COATED ORAL at 09:06

## 2020-06-10 RX ADMIN — METHOCARBAMOL TABLETS 500 MG: 500 TABLET, COATED ORAL at 09:06

## 2020-06-10 RX ADMIN — FUROSEMIDE 40 MG: 10 INJECTION, SOLUTION INTRAMUSCULAR; INTRAVENOUS at 12:06

## 2020-06-10 NOTE — PT/OT/SLP PROGRESS
Occupational Therapy   Treatment    Name: Jeffery Meltno  MRN: [id number]  Admitting Diagnosis:  S/P CABG (coronary artery bypass graft)  5 Days Post-Op    Recommendations:     Discharge Recommendations: home  Discharge Equipment Recommendations:  none  Barriers to discharge:  None    Assessment:     Jeffery Melton is a 64 y.o. male with a medical diagnosis of S/P CABG (coronary artery bypass graft).  He presents with improving mobility and selfcare.  Pt had no concerns regarding his discharge. Performance deficits affecting function are impaired endurance, impaired cardiopulmonary response to activity.     Rehab Prognosis:  Pt discharging from the hospital today.; patient would benefit from acute skilled OT services to address these deficits and reach maximum level of function.       Plan:     Patient to be seen 5 x/week to address the above listed problems via self-care/home management, therapeutic activities, therapeutic exercises, neuromuscular re-education  · Plan of Care Expires: 06/10/20  · Plan of Care Reviewed with: patient    Subjective     Pain/Comfort:  · Pain Rating 1: 0/10  · Pain Rating Post-Intervention 1: 0/10    Objective:     Communicated with: RN prior to session.  Patient found standing in the room with colostomy, telemetry upon OT entry to room.    General Precautions: Standard, fall, sternal   Orthopedic Precautions:N/A   Braces: N/A     AMPAC 6 Click ADL: 23    Treatment & Education:  -Pt edu on OT role/POC  -Importance of OOB activity with staff assistance  -Safety during functional t/f and mobility  - White board updated  - Multiple self care tasks/functional mobility completed-- assistance level noted above  - All questions/concerns answered within OT scope of practice  Discussed discharge environment.  Discussed strategies for adopting health habits.  Reviewed hospital stay. Discussed appropriate energy management post discharge.     Patient left Standing infront of room chair with all lines  intact, call button in reach, RN notified and family presentEducation:      GOALS:   Multidisciplinary Problems     Occupational Therapy Goals     Not on file          Multidisciplinary Problems (Resolved)        Problem: Occupational Therapy Goal    Goal Priority Disciplines Outcome Interventions   Occupational Therapy Goal   (Resolved)     OT, PT/OT Met    Description:  Goals to be met by: 6/16/20     Patient will increase functional independence with ADLs by performing:    Feeding with [place].goal met  UE Dressing with Supervision  LE Dressing with Supervision.  Grooming while standing at sink with Supervision.  Toileting from toilet with Supervision.  Toilet transfer to toilet with Supervision. Discontinue-colostomy                       Time Tracking:     OT Date of Treatment: 06/10/20  OT Start Time: 1451  OT Stop Time: 1459  OT Total Time (min): 8 min    Billable Minutes:Therapeutic Activity 8 mins    Daniel Duvall OT  6/10/2020

## 2020-06-10 NOTE — PLAN OF CARE
Pt free of falls/injuries. No c/o SOB or chest pain. NSR on tele. VSS. MS incision CDI and KENDELL. Pt receiving Lasix 40 mg IV push q12h. Amio gtt initiated @ 0.5 mg/min, 16.7 ml/hr following episode of A.Fib with RVR during day shift. TEDs in place. Oxy q4h. Plan is for possible d/c 6/10/20. Pt is ambulated in [place] x 3 today with standby assistance. POC reviewed with pt. Call light in reach. Bed alarm activated. Bed @ lowest position. Will continue to monitor.

## 2020-06-10 NOTE — PLAN OF CARE
Pt [email or web address] home with home health.    [facility] clinic staff to schedule follow ups. They will contact patient.       06/10/20 6573   Final Note   Assessment Type Final Discharge Note   Anticipated Discharge Disposition Home-Health   Hospital Follow Up  Appt(s) scheduled? Yes   Discharge plans and expectations educations in teach back method with documentation complete? Yes     Julie Haase RN  Case Management [phone]

## 2020-06-10 NOTE — PLAN OF CARE
RIDGE faxed HH Orders to [facility] via  for review. RIDGE will continue to follow.      06/10/20 1220   Post-Acute Status   Post-Acute Authorization Home Health   Home Health Status Referrals Sent     2:57 PM  Patient was out of the area for [place]. RIDGE faxed referral to [facility] via  for review. RIDGE will continue to follow.     Meliza Story LMSW   - [facility]  Ext. [phone]

## 2020-06-10 NOTE — PT/OT/SLP PROGRESS
Physical Therapy Treatment    Patient Name:  Jeffery Melton   MRN:  [id number]  Admitting Diagnosis:  S/P CABG (coronary artery bypass graft)   Recent Surgery: Procedure(s) (LRB):  CORONARY ARTERY BYPASS GRAFT (CABG) x 3  (N/A) 5 Days Post-Op  Admit Date: 6/5/2020  Length of Stay: 5 days    Recommendations:     Discharge Recommendations:  home   Discharge Equipment Recommendations: none   Barriers to discharge: None    Assessment:     Jeffery Melton is a 64 y.o. male admitted with a medical diagnosis of S/P CABG (coronary artery bypass graft).  He presents with the following impairments/functional limitations:  weakness, impaired endurance, impaired functional mobilty, impaired cardiopulmonary response to activity, impaired self care skills. Pt tolerated session well and was able to ambulate with a decreased level of assist and with less rest breaks than previous session. Pt with some mild SOB during ambulation but was able to recover with PLB. Pt will continue to benefit from skilled PT services during this hospital admit to continue to improve transfer ability and efficiency as well as continue to progress pt's ambulation distance and cardiopulmonary endurance to maximize pt's functional independence and return to PLOF. After discharge pt will benefit from HHPT to further progress functional mobility and cardiopulmonary endurance as well as for home safety and energy conservation techniques.    Rehab Prognosis: Good; patient would benefit from acute skilled PT services to address these deficits and reach maximum level of function.    Recent Surgery: Procedure(s) (LRB):  CORONARY ARTERY BYPASS GRAFT (CABG) x 3  (N/A) 5 Days Post-Op    Plan:     During this hospitalization, patient to be seen 5 x/week to address the identified rehab impairments via gait training, therapeutic activities, therapeutic exercises and progress toward the following goals:    · Plan of Care Expires:  07/05/20    Subjective     RN notified prior to  session. No family/friends present upon PT entrance into room.    Chief Complaint: Pt with no complaints  Patient/Family Comments/goals: Pt hopeful to go home soon  Pain/Comfort:  · Pain Rating 1: 0/10  · Pain Rating Post-Intervention 1: 0/10      Objective:     Additional staff present: none    Patient found up in chair with: telemetry, PICC line, colostomy   Mental Status: Patient is oriented to AxOx4 and follows multistep commands. Patient is Alert and Cooperative during session.    General Precautions: Standard, Cardiac fall, sternal   Orthopedic Precautions:N/A   Braces: N/A   Body mass index is 30.75 kg/m².  Oxygen Device: Room Air    Outcome Measures:  AM-PAC 6 CLICK MOBILITY  Turning over in bed (including adjusting bedclothes, sheets and blankets)?: 4  Sitting down on and standing up from a chair with arms (e.g., wheelchair, bedside commode, etc.): 4  Moving from lying on back to sitting on the side of the bed?: 4  Moving to and from a bed to a chair (including a wheelchair)?: 4  Need to walk in hospital room?: 3  Climbing 3-5 steps with a railing?: 3  Basic Mobility Total Score: 22     Functional Mobility:    Bed Mobility:   · Pt found/returned to bedside chair    Transfers:   · Sit <> Stand Transfer: supervision with no assistive device   · Stand <> Sit Transfer: supervision with no assistive device   · [id number] from bedside chair    Standing Balance:   Assistance Level Required: Supervision   Patient used: no assistive device       Gait:  · Patient ambulated: 400 ft   · Patient required: standy by assistance  · Patient used:  no assistive device   · Gait Pattern observed: reciprocal gait  · Gait Deviation(s): decreased step length, wide base of support, decreased arm swing, steady gait and decreased eliza  · Impairments due to: decreased endurance  · all lines remained intact throughout ambulation trial  · Mask donned for out of room ambulation  · Comments: Pt was able to perform vertical/horizontal  head turns, 180 degree turns while ambulating, and avoid hallway obstacles without LOB. Cueing for slowing of gait speed and use of PLB provided intermittently throughout gait training.    Stairs: not assessed as pt will be staying at his daughter's house which has no stairs    Education:   Time provided for education, counseling and discussion of health disposition in regards to patient's current status   All questions answered within PT scope of practice and to patient's satisfaction   PT role in POC to address current functional deficits   Pt educated on proper body mechanics, safety techniques, and energy conservation with PT facilitation and cueing throughout session   Whiteboard updated with pt's current mobility status documented above   Safe to perform step transfer to/from chair/bedside commode supervision and no AD w/ nursing/PCT present   Pt to ambulate 3-4x/day stand-by assist and no AD with nsg/PCT in order to maintain functional mobility   Importance of OOB tolerance 8-10 hrs/day to improve lung ventilation and expansion as well as strengthen postural musculature    Patient left up in chair with all lines intact, call button in reach and RN notified.    GOALS:   Multidisciplinary Problems     Physical Therapy Goals     Not on file          Multidisciplinary Problems (Resolved)        Problem: Physical Therapy Goal    Goal Priority Disciplines Outcome Goal Variances Interventions   Physical Therapy Goal   (Resolved)     PT, PT/OT Met     Description:  Goals to be met by: 2020    Patient will increase functional independence with mobility by performin. Supine to sit with Contact Guard Assistance - not met  2. Sit to stand transfer with Supervision - not met  3. Gait  x 150 feet with Supervision - not met  4. Ascend/descend 6 stair with bilateral Handrails Contact Guard Assistance - not met                    Time Tracking:     PT Received On: 06/10/20  PT Start Time: 859     PT Stop  Time: 0910  PT Total Time (min): 11 min       Billable Minutes:   · Gait Training 11    Treatment Type: Treatment  PT/PTA: PT       Winsome Coles PT, DPT  6/10/2020  Pager: [phone]

## 2020-06-10 NOTE — PLAN OF CARE
Patient has been accepted to [facility].      06/10/20 1520   Post-Acute Status   Post-Acute Authorization Home Health   Home Health Status Set-up Complete     Meliza Story LMSW   - [facility]  Ext. [phone]

## 2020-06-10 NOTE — PROGRESS NOTES
Patient is ready for discharge. Patient stable alert and oriented. VSS. IVs removed. No complaints of pain. Discussed discharge plan. Reviewed medications and side effects, appointments, and answered questions with patient and family. Rx's sent to home pharmacy.

## 2020-06-10 NOTE — DISCHARGE SUMMARY
[facility]  Cardiothoracic Surgery  Discharge Summary      Patient Name: Jeffery Melton  MRN: [id number]  Admission Date: 6/5/2020  Hospital Length of Stay: 5 days  Discharge Date and Time:  06/10/2020 11:03 AM  Attending Physician: Anuj Ceron MD   Discharging Provider: Brook Solitario NP  Primary Care Provider: Primary Doctor No    HPI:   Mr. Melton is a 64 year old man who initially presented with dyspnea on exertion and chest heaviness.  He had an angiogram in November of 2019 which demonstrated multivessel disease.  He was told at that time that percutaneous intervention was not an option.  He sought a 2nd opinion here at [facility], and surgical revascularization was recommended.  He now presents for coronary artery bypass grafting.    Procedure(s) (LRB):  CORONARY ARTERY BYPASS GRAFT (CABG) x 3  (N/A)      Indwelling Lines/Drains at time of discharge:   Lines/Drains/Airways     Drain                 Colostomy LLQ -- days              Hospital Course: On 6/5/2020 the patient was taken to the Operating Room for the above stated procedure. Please see the previously dictated operative report for complete details. Postoperatively, the patient was taken from the  Operating Room to the ICU where the vital signs were monitored and pain was kept under control. The patient was weaned from the drips and extubated in the ICU per protocol. Once hemodynamically stable, the patient was transferred to the Cardiac Step-Down floor for continued strengthening and ambulation. On postoperative day 5, the patient was ready for discharge to home. At the time of discharge, the patient was ambulating unassisted. Pain was well controlled with oral analgesics and the patient was tolerating the diet.  Post operative course was complicated by atrial fibrillation with RVR.  Patient received amiodarone infusion for 24 hours and transitioned to oral amiodarone.  Patient will be discharged home on amiodarone, eliquis,  beta blocker, statin and ASA.      MOBILITY AND ACTIVITY: As tolerated. Patient may shower. No heavy lifting of greater than 5 pounds and no driving.     DIET: An 1800-calorie ADA with a 1500 mL fluid restriction.     WOUND CARE INSTRUCTIONS: Check for redness, swelling and drainage around the  incision or wound. Patient is to call for any obvious bleeding, drainage, pus from the wound, unusual problems or difficulties or temperature of greater than 101   degrees.     FOLLOWUP: Follow up with  in approximately 3 weeks. Prior to this  appointment, the patient will have a chest x-ray and EKG.     Patient not placed on Ace-Inhibitor at the time of discharge due to potential for hypotension     DISCHARGE CONDITION: At the time of discharge, the patient was in sinus rhythm and afebrile with stable vital signs.    Discharge instructions were given to the patient and his family.  All parties state a verbal understand of instructions given. Patient was provided with CTS contact information for any questions or concerns once discharged.      Consults (From admission, onward)        Status Ordering Provider     Consult Case Management/Social Work  Once     Provider:  (Not yet assigned)    Completed SHAJI VELAZQUEZ     Consult to Endocrinology  Once     Provider:  (Not yet assigned)    Completed SHAJI VELAZQUEZ     Inpatient consult to Cardiac Rehab  Once     Provider:  (Not yet assigned)    Completed SHAJI VELAZQUEZ     Inpatient consult to Registered Dietitian/Nutritionist  Once     Provider:  (Not yet assigned)    Completed SHAJI VELAZQUEZ          Pending Diagnostic Studies:     None          No new Assessment & Plan notes have been filed under this hospital service since the last note was generated.  Service: Cardiothoracic Surgery    Final Active Diagnoses:    Diagnosis Date Noted POA    PRINCIPAL PROBLEM:  S/P CABG (coronary artery bypass graft) [Z95.1] 06/05/2020 Not Applicable    Paroxysmal atrial  fibrillation [I48.0] 06/09/2020 No    Hypophosphatemia [E83.39] 06/08/2020 No    Other specified anemias [D64.89] 06/08/2020 Unknown    Atherosclerosis of native coronary artery of native heart with angina pectoris [I25.119] 06/05/2020 Yes    Hyperglycemia [R73.9] 06/05/2020 Unknown    Class 1 obesity due to excess calories with serious comorbidity and body mass index (BMI) of 31.0 to 31.9 in adult [E66.09, Z68.31] 06/05/2020 Not Applicable      Problems Resolved During this Admission:      Discharged Condition: stable    Disposition: Home or Self Care    Follow Up:    Patient Instructions:      Ambulatory referral/consult to Home Health   Standing Status: Future   Referral Priority: Routine Referral Type: Home Health   Referral Reason: Specialty Services Required   Requested Specialty: Home Health Services   Number of Visits Requested: 1     Medications:  Reconciled Home Medications:      Medication List      START taking these medications    amiodarone 200 MG Tab  Commonly known as:  PACERONE  Take 2 tablets (400 mg total) by mouth 2 (two) times daily.     apixaban 5 mg Tab  Commonly known as:  ELIQUIS  Take 1 tablet (5 mg total) by mouth 2 (two) times daily.     docusate sodium 100 MG capsule  Commonly known as:  COLACE  Take 1 capsule (100 mg total) by mouth 2 (two) times daily. for 7 days     furosemide 20 MG tablet  Commonly known as:  LASIX  Take 1 tablet (20 mg total) by mouth 2 (two) times daily.     methocarbamoL 500 MG Tab  Commonly known as:  ROBAXIN  Take 1 tablet (500 mg total) by mouth 4 (four) times daily. for 10 days     metoprolol tartrate 25 MG tablet  Commonly known as:  LOPRESSOR  Take 1 tablet (25 mg total) by mouth 2 (two) times daily.     oxyCODONE 5 MG immediate release tablet  Commonly known as:  ROXICODONE  Take 1 tablet (5 mg total) by mouth every 4 (four) hours as needed.     potassium chloride SA 20 MEQ tablet  Commonly known as:  K-DUR,KLOR-CON  Take 1 tablet (20 mEq total) by  mouth every 12 (twelve) hours.        CONTINUE taking these medications    aspirin 81 MG Chew  Take 81 mg by mouth.     atorvastatin 80 MG tablet  Commonly known as:  LIPITOR     fish oil-omega-3 fatty acids 300-1,000 mg capsule  Take 2 g by mouth.     loratadine 10 mg tablet  Commonly known as:  CLARITIN  Take 10 mg by mouth.     pantoprazole 40 MG tablet  Commonly known as:  PROTONIX  Take 40 mg by mouth.        STOP taking these medications    enalapril 20 MG tablet  Commonly known as:  VASOTEC     hydroCHLOROthiazide 25 MG tablet  Commonly known as:  HYDRODIURIL          Time spent on the discharge of patient: 36 minutes    Brook Solitario NP  Cardiothoracic Surgery  [facility]

## 2020-06-10 NOTE — PROGRESS NOTES
Study name: Anticoagulation for New-Onset Post-Operative Atrial Fibrillation after CABG- PACES    IRB:  2019.273    Sponsored by: [employer] ([employer])    PI: Dr. MADISON Ceron    Study Visit: Informed Consent for Registry     Who was present: Patient and Ofelia GarciaEMELY farrar     Date Consent signed: 06/10/2020    Is LAR Consenting for Subject: NA    06/10/2020: I met with the patient for the PACES Study.  Patient has had 60 minutes of continuous atrial fibrillation and repeated episodes and meets criteria for randomization.     Prior to the Informed Consent (IC) being signed, or any study protocol required data collection, testing, procedure, or intervention being performed, the following was done and/or discussed:   Patient was given a copy of the IC for review    Purpose of the study and qualifications to participate    Study design, Follow up schedule, and tests or procedures done at each visit   Confidentiality and HIPAA Authorization for Release of Medical Records for the research trial/ subject's rights/research related injury   Risk, Benefits, Alternative Treatments, Compensation and Costs   Participation in the research trial is voluntary and patient may withdraw at anytime   Contact information for study related questions    Patient verbalizes understanding of the above: Yes  Contact information for CRC and PI given to patient: Yes  Patient able to adequately summarize: the purpose of the study, the risks associated with the study, and all procedures, testing, and follow-ups associated with the study: Yes, consented for registry only.     Patient signed the informed consent form for the Anticoagulation for New-Onset Post-Operative Atrial Fibrillation after CABG- PACES Registry with an IRB approval date of 03/09/2020 [place]) and 10/28/2019 ([facility]).  Each page of the consent form was reviewed with patient and all questions answered satisfactorily. Patient signed the  consent form and received a copy of same. The original consent was scanned into electronic medical records (EPIC) and filed into the subject's research study binder.       Randomization/treatment assignment: Not done due to Registry only.  Patient informed of randomization assignment and verbalized understanding.     09:20 Spoke to the patient in the morning regarding the PACES trial.  The patient wanted to speak to his wife prior to signing. Notified Brook Solitario NP of patient status for study.  She is currently on rounds with Dr. Ceron and will call me back after rounds.    11:40 Patient declined participation for clinical trial, but willing to consent for registry.  Spent 30 minutes with the patient for the ICF for Registry.  Copy given to patient.  He did not want to complete the Decliner survey - to tired to complete the Demographic worksheet at this time.  Requested to take the decliner survey and demographic worksheet home and he will mail the forms back to the office. Contact information attached to the survey.

## 2020-06-11 ENCOUNTER — PATIENT OUTREACH (OUTPATIENT)
Dept: ADMINISTRATIVE | Facility: CLINIC | Age: 65
End: 2020-06-11

## 2020-06-11 DIAGNOSIS — I25.119 ATHEROSCLEROSIS OF NATIVE CORONARY ARTERY OF NATIVE HEART WITH ANGINA PECTORIS: Primary | ICD-10-CM

## 2020-06-11 PROCEDURE — G0180 PR HOME HEALTH MD CERTIFICATION: ICD-10-PCS | Mod: ,,, | Performed by: THORACIC SURGERY (CARDIOTHORACIC VASCULAR SURGERY)

## 2020-06-11 PROCEDURE — G0180 MD CERTIFICATION HHA PATIENT: HCPCS | Mod: ,,, | Performed by: THORACIC SURGERY (CARDIOTHORACIC VASCULAR SURGERY)

## 2020-06-11 NOTE — PATIENT INSTRUCTIONS
When to Call the Doctor After Bypass Surgery    Call your doctor if you have any of these symptoms:  · Angina or chest pain symptoms like those you felt before surgery (call 911)  · Fever  of 100.4?F (38?C) or higher, or as directed by your healthcare provider  · Unexplained chills or sweating  · Shortness of breath or trouble breathing  · Sharp pain in the chest on taking a deep breath  · Belly (abdominal) pain, nausea, or vomiting that doesn't go away  · Fast or irregular heartbeat  · Dizziness or fainting  · Increasing pain that doesn't get better after taking pain medicine  · Swelling, redness, oozing, or cloudy discharge at the incision sites  · Unexplained bruising or bleeding  · Continued sensation of motion or clicking sounds in your breastbone  · Sudden weight gain. This means 5 pounds or more in 1 week.  · Increased swelling of the legs, especially on the side where the vein was not removed  Date Last Reviewed: 10/1/2016  © 0467-8880 [employer]. [address] [place] [address], [place] [address]. All rights reserved. This information is not intended as a substitute for professional medical care. Always follow your healthcare professional's instructions.

## 2020-06-18 ENCOUNTER — EXTERNAL HOME HEALTH (OUTPATIENT)
Dept: HOME HEALTH SERVICES | Facility: HOSPITAL | Age: 65
End: 2020-06-18
Payer: COMMERCIAL

## 2020-07-07 ENCOUNTER — HOSPITAL ENCOUNTER (OUTPATIENT)
Dept: RADIOLOGY | Facility: HOSPITAL | Age: 65
Discharge: HOME | End: 2020-07-07
Attending: THORACIC SURGERY (CARDIOTHORACIC VASCULAR SURGERY)
Payer: COMMERCIAL

## 2020-07-07 ENCOUNTER — DOCUMENTATION ONLY (OUTPATIENT)
Dept: CARDIOTHORACIC SURGERY | Facility: CLINIC | Age: 65
End: 2020-07-07

## 2020-07-07 ENCOUNTER — OFFICE VISIT (OUTPATIENT)
Dept: CARDIOTHORACIC SURGERY | Facility: CLINIC | Age: 65
End: 2020-07-07
Payer: COMMERCIAL

## 2020-07-07 ENCOUNTER — HOSPITAL ENCOUNTER (OUTPATIENT)
Dept: CARDIOLOGY | Facility: CLINIC | Age: 65
Discharge: HOME | End: 2020-07-07
Attending: THORACIC SURGERY (CARDIOTHORACIC VASCULAR SURGERY)
Payer: COMMERCIAL

## 2020-07-07 VITALS
BODY MASS INDEX: 29.52 KG/M2 | HEIGHT: 71 IN | HEART RATE: 57 BPM | WEIGHT: 210.88 LBS | OXYGEN SATURATION: 99 % | SYSTOLIC BLOOD PRESSURE: 124 MMHG | DIASTOLIC BLOOD PRESSURE: 72 MMHG | TEMPERATURE: 99 F

## 2020-07-07 DIAGNOSIS — I25.119 ATHEROSCLEROSIS OF NATIVE CORONARY ARTERY OF NATIVE HEART WITH ANGINA PECTORIS: ICD-10-CM

## 2020-07-07 DIAGNOSIS — I25.110 CORONARY ARTERY DISEASE INVOLVING NATIVE CORONARY ARTERY OF NATIVE HEART WITH UNSTABLE ANGINA PECTORIS: Primary | ICD-10-CM

## 2020-07-07 DIAGNOSIS — Z95.1 S/P CABG (CORONARY ARTERY BYPASS GRAFT): Primary | ICD-10-CM

## 2020-07-07 PROCEDURE — 93010 ELECTROCARDIOGRAM REPORT: CPT | Mod: S$GLB,,, | Performed by: INTERNAL MEDICINE

## 2020-07-07 PROCEDURE — 93005 ELECTROCARDIOGRAM TRACING: CPT | Mod: S$GLB,,, | Performed by: THORACIC SURGERY (CARDIOTHORACIC VASCULAR SURGERY)

## 2020-07-07 PROCEDURE — 99999 PR PBB SHADOW E&M-EST. PATIENT-LVL IV: CPT | Mod: PBBFAC,,, | Performed by: THORACIC SURGERY (CARDIOTHORACIC VASCULAR SURGERY)

## 2020-07-07 PROCEDURE — 99024 POSTOP FOLLOW-UP VISIT: CPT | Mod: S$GLB,,, | Performed by: THORACIC SURGERY (CARDIOTHORACIC VASCULAR SURGERY)

## 2020-07-07 PROCEDURE — 93005 EKG 12-LEAD: ICD-10-PCS | Mod: S$GLB,,, | Performed by: THORACIC SURGERY (CARDIOTHORACIC VASCULAR SURGERY)

## 2020-07-07 PROCEDURE — 71046 XR CHEST PA AND LATERAL: ICD-10-PCS | Mod: 26,,, | Performed by: RADIOLOGY

## 2020-07-07 PROCEDURE — 93010 EKG 12-LEAD: ICD-10-PCS | Mod: S$GLB,,, | Performed by: INTERNAL MEDICINE

## 2020-07-07 PROCEDURE — 99024 PR POST-OP FOLLOW-UP VISIT: ICD-10-PCS | Mod: S$GLB,,, | Performed by: THORACIC SURGERY (CARDIOTHORACIC VASCULAR SURGERY)

## 2020-07-07 PROCEDURE — 99999 PR PBB SHADOW E&M-EST. PATIENT-LVL IV: ICD-10-PCS | Mod: PBBFAC,,, | Performed by: THORACIC SURGERY (CARDIOTHORACIC VASCULAR SURGERY)

## 2020-07-07 PROCEDURE — 71046 X-RAY EXAM CHEST 2 VIEWS: CPT | Mod: TC,FY

## 2020-07-07 PROCEDURE — 71046 X-RAY EXAM CHEST 2 VIEWS: CPT | Mod: 26,,, | Performed by: RADIOLOGY

## 2020-07-07 RX ORDER — EPINEPHRINE 0.22MG
AEROSOL WITH ADAPTER (ML) INHALATION
COMMUNITY

## 2020-07-07 RX ORDER — MAGNESIUM 250 MG
TABLET ORAL
COMMUNITY

## 2020-07-07 RX ORDER — NITROGLYCERIN 0.4 MG/1
0.4 TABLET SUBLINGUAL
COMMUNITY
Start: 2019-11-11

## 2020-07-07 RX ORDER — LEVOCETIRIZINE DIHYDROCHLORIDE 5 MG/1
TABLET, FILM COATED ORAL
COMMUNITY
Start: 2009-03-07

## 2020-07-07 RX ORDER — OMEPRAZOLE 40 MG/1
CAPSULE, DELAYED RELEASE ORAL
COMMUNITY

## 2020-07-07 RX ORDER — GUAIFENESIN 600 MG/1
600 TABLET, EXTENDED RELEASE ORAL
COMMUNITY
Start: 2009-03-07

## 2020-07-07 RX ORDER — AZITHROMYCIN 250 MG/1
250 TABLET, FILM COATED ORAL
COMMUNITY
Start: 2009-03-07

## 2020-07-07 RX ORDER — ESCITALOPRAM OXALATE 10 MG/1
10 TABLET ORAL
COMMUNITY

## 2020-07-07 RX ORDER — ALBUTEROL SULFATE 5 MG/ML
2.5 SOLUTION RESPIRATORY (INHALATION) EVERY 6 HOURS PRN
COMMUNITY

## 2020-07-07 NOTE — PROGRESS NOTES
Patient seen and examined. Patient is progressively increasing activity. No significant complaints.     Sternum: stable, incision CDI  Chest xray: Acceptable post op chest  EKG: NSR     Assessment:  CABG x3 Surgery     Plan:  Can begin driving   Can begin cardiac rehab 6 weeks after operation   We will refer to cardiology to assume care   DC lasix, potassium  D/C amiodarone  Continuing Eliquis for PACeS trial          No scheduled appointment, RTC prn    CTS Attending Note:    I have personally taken the history and examined this patient and agree with the MARINA's note as stated above.

## 2020-07-10 ENCOUNTER — DOCUMENT SCAN (OUTPATIENT)
Dept: HOME HEALTH SERVICES | Facility: HOSPITAL | Age: 65
End: 2020-07-10
Payer: COMMERCIAL

## (undated) DEVICE — SUT LIGACLIP SMALL XTRA

## (undated) DEVICE — DRESSING TELFA STRL 4X3 LF

## (undated) DEVICE — SEE MEDLINE ITEM 157117

## (undated) DEVICE — NDL 18GA X1 1/2 REG BEVEL

## (undated) DEVICE — INSERTS STEALTH FIBRA SIZE 5

## (undated) DEVICE — Device

## (undated) DEVICE — CANNULA VESSEL FREE FLOW

## (undated) DEVICE — SUT PROLENE 7-0 BV-1 30

## (undated) DEVICE — ELECTRODE REM PLYHSV RETURN 9

## (undated) DEVICE — GAUZE SPONGE 4X4 12PLY

## (undated) DEVICE — DRAIN CHEST DRY SUCTION

## (undated) DEVICE — CLIP SPRING 6MM

## (undated) DEVICE — KIT SAHARA DRAPE DRAW/LIFT

## (undated) DEVICE — SUT SILK BLK BR. 2 2-60

## (undated) DEVICE — BLADE 4IN EDGE INSULATED

## (undated) DEVICE — PLEDGET SUT SOFT 3/8X3/16X1/16

## (undated) DEVICE — DRESSING ADH ISLAND 3.6 X 14

## (undated) DEVICE — SUT 2/0 36IN COATED VICRYL

## (undated) DEVICE — SYS VIRTUOSAPH PLUS EVM

## (undated) DEVICE — WIRE INTRAMYOCARDIAL TEMP

## (undated) DEVICE — WIPE ESENTA BARR STNG FREE 3ML

## (undated) DEVICE — BLOWER MISTER

## (undated) DEVICE — SUT SILK 2-0 SH 18IN BLACK

## (undated) DEVICE — DRAPE SPLIT STERILE

## (undated) DEVICE — CLOSURE SKIN STERI STRIP 1/2X4

## (undated) DEVICE — SEE MEDLINE ITEM 146417

## (undated) DEVICE — SUT PROLENE 4-0 SH BLU 36IN

## (undated) DEVICE — DRESSING TRANS 4X4 TEGADERM

## (undated) DEVICE — BLADE SAW STERNAL 5/BX

## (undated) DEVICE — RETRACTOR OCTOBASE INSERT HOLD

## (undated) DEVICE — TUBING HF INSUFFLATION W/ FLTR

## (undated) DEVICE — SEE MEDLINE ITEM 152515

## (undated) DEVICE — KIT URINARY CATH URINE METER

## (undated) DEVICE — SUT 4-0 12-18IN SILK BLACK

## (undated) DEVICE — DRESSING AQUACEL SACRAL 9 X 9

## (undated) DEVICE — BANDAGE ACE ELASTIC 6"

## (undated) DEVICE — SUT VICRYL 3-0 27 SH

## (undated) DEVICE — SUT VICRYL BR 1 GEN 27 CT-1

## (undated) DEVICE — DRAPE SLUSH WARMER WITH DISC

## (undated) DEVICE — SET DECANTER MEDICHOICE

## (undated) DEVICE — GLOVE BIOGEL PI MICRO SZ 7.5

## (undated) DEVICE — TRAY HEART

## (undated) DEVICE — SUT 6 18IN STEEL MONO CCS

## (undated) DEVICE — SUT PROLENE 4-0 RB-1 BL MO

## (undated) DEVICE — SPONGE GAUZE 16PLY 4X4

## (undated) DEVICE — TAPE ADH MEDIPORE 4 X 10YDS

## (undated) DEVICE — SUT PROLENE 5-0 BL C-1 4-24

## (undated) DEVICE — SYR 3CC LUER LOC